# Patient Record
Sex: FEMALE | Race: BLACK OR AFRICAN AMERICAN | Employment: UNEMPLOYED | URBAN - METROPOLITAN AREA
[De-identification: names, ages, dates, MRNs, and addresses within clinical notes are randomized per-mention and may not be internally consistent; named-entity substitution may affect disease eponyms.]

---

## 2017-01-01 ENCOUNTER — OFFICE VISIT (OUTPATIENT)
Dept: FAMILY MEDICINE CLINIC | Age: 52
End: 2017-01-01

## 2017-01-01 ENCOUNTER — TELEPHONE (OUTPATIENT)
Dept: RHEUMATOLOGY | Age: 52
End: 2017-01-01

## 2017-01-01 ENCOUNTER — OFFICE VISIT (OUTPATIENT)
Dept: RHEUMATOLOGY | Age: 52
End: 2017-01-01

## 2017-01-01 VITALS
BODY MASS INDEX: 40.23 KG/M2 | TEMPERATURE: 97.6 F | OXYGEN SATURATION: 96 % | HEIGHT: 62 IN | WEIGHT: 218.6 LBS | RESPIRATION RATE: 20 BRPM | DIASTOLIC BLOOD PRESSURE: 68 MMHG | SYSTOLIC BLOOD PRESSURE: 139 MMHG | HEART RATE: 63 BPM

## 2017-01-01 VITALS
RESPIRATION RATE: 16 BRPM | HEART RATE: 68 BPM | WEIGHT: 225 LBS | DIASTOLIC BLOOD PRESSURE: 72 MMHG | TEMPERATURE: 97.7 F | SYSTOLIC BLOOD PRESSURE: 138 MMHG | HEIGHT: 62 IN | OXYGEN SATURATION: 98 % | BODY MASS INDEX: 41.41 KG/M2

## 2017-01-01 VITALS
BODY MASS INDEX: 37.69 KG/M2 | WEIGHT: 204.8 LBS | TEMPERATURE: 98.2 F | SYSTOLIC BLOOD PRESSURE: 136 MMHG | HEIGHT: 62 IN | RESPIRATION RATE: 18 BRPM | DIASTOLIC BLOOD PRESSURE: 57 MMHG | HEART RATE: 82 BPM

## 2017-01-01 DIAGNOSIS — I10 ESSENTIAL HYPERTENSION: ICD-10-CM

## 2017-01-01 DIAGNOSIS — M17.0 OSTEOARTHRITIS OF BOTH KNEES, UNSPECIFIED OSTEOARTHRITIS TYPE: ICD-10-CM

## 2017-01-01 DIAGNOSIS — F32.A DEPRESSION, UNSPECIFIED DEPRESSION TYPE: ICD-10-CM

## 2017-01-01 DIAGNOSIS — Z79.60 LONG-TERM USE OF IMMUNOSUPPRESSANT MEDICATION: ICD-10-CM

## 2017-01-01 DIAGNOSIS — I10 ESSENTIAL HYPERTENSION, BENIGN: Primary | ICD-10-CM

## 2017-01-01 DIAGNOSIS — Z12.39 BREAST CANCER SCREENING: ICD-10-CM

## 2017-01-01 DIAGNOSIS — N76.0 ACUTE VAGINITIS: ICD-10-CM

## 2017-01-01 DIAGNOSIS — M06.9 RHEUMATOID ARTHRITIS INVOLVING ANKLE, UNSPECIFIED LATERALITY, UNSPECIFIED RHEUMATOID FACTOR PRESENCE: ICD-10-CM

## 2017-01-01 DIAGNOSIS — R13.10 DYSPHAGIA, UNSPECIFIED TYPE: ICD-10-CM

## 2017-01-01 DIAGNOSIS — Z79.52 LONG TERM CURRENT USE OF SYSTEMIC STEROIDS: ICD-10-CM

## 2017-01-01 DIAGNOSIS — K21.9 GASTROESOPHAGEAL REFLUX DISEASE WITHOUT ESOPHAGITIS: ICD-10-CM

## 2017-01-01 DIAGNOSIS — M06.9 RHEUMATOID ARTHRITIS INVOLVING MULTIPLE SITES, UNSPECIFIED RHEUMATOID FACTOR PRESENCE: Primary | ICD-10-CM

## 2017-01-01 DIAGNOSIS — R60.9 DEPENDENT EDEMA: ICD-10-CM

## 2017-01-01 DIAGNOSIS — M94.9 DISORDER OF BONE AND CARTILAGE, UNSPECIFIED: ICD-10-CM

## 2017-01-01 DIAGNOSIS — M89.9 DISORDER OF BONE AND CARTILAGE, UNSPECIFIED: ICD-10-CM

## 2017-01-01 LAB
ALBUMIN SERPL-MCNC: 4 G/DL (ref 3.5–5.5)
ALBUMIN SERPL-MCNC: 4 G/DL (ref 3.5–5.5)
ALBUMIN/GLOB SERPL: 1.3 {RATIO} (ref 1.2–2.2)
ALBUMIN/GLOB SERPL: 1.4 {RATIO} (ref 1.2–2.2)
ALP SERPL-CCNC: 64 IU/L (ref 39–117)
ALP SERPL-CCNC: 72 IU/L (ref 39–117)
ALT SERPL-CCNC: 11 IU/L (ref 0–32)
ALT SERPL-CCNC: 9 IU/L (ref 0–32)
AST SERPL-CCNC: 13 IU/L (ref 0–40)
AST SERPL-CCNC: 14 IU/L (ref 0–40)
BASOPHILS # BLD AUTO: 0 X10E3/UL (ref 0–0.2)
BASOPHILS # BLD AUTO: 0 X10E3/UL (ref 0–0.2)
BASOPHILS NFR BLD AUTO: 1 %
BASOPHILS NFR BLD AUTO: 1 %
BILIRUB SERPL-MCNC: 0.2 MG/DL (ref 0–1.2)
BILIRUB SERPL-MCNC: 0.3 MG/DL (ref 0–1.2)
BUN SERPL-MCNC: 14 MG/DL (ref 6–24)
BUN SERPL-MCNC: 20 MG/DL (ref 6–24)
BUN/CREAT SERPL: 19 (ref 9–23)
BUN/CREAT SERPL: 26 (ref 9–23)
CALCIUM SERPL-MCNC: 10.1 MG/DL (ref 8.7–10.2)
CALCIUM SERPL-MCNC: 9.9 MG/DL (ref 8.7–10.2)
CHLORIDE SERPL-SCNC: 103 MMOL/L (ref 96–106)
CHLORIDE SERPL-SCNC: 107 MMOL/L (ref 96–106)
CO2 SERPL-SCNC: 19 MMOL/L (ref 18–29)
CO2 SERPL-SCNC: 22 MMOL/L (ref 18–29)
CREAT SERPL-MCNC: 0.73 MG/DL (ref 0.57–1)
CREAT SERPL-MCNC: 0.77 MG/DL (ref 0.57–1)
CRP SERPL-MCNC: 27.3 MG/L (ref 0–4.9)
CRP SERPL-MCNC: 8.1 MG/L (ref 0–4.9)
EOSINOPHIL # BLD AUTO: 0.1 X10E3/UL (ref 0–0.4)
EOSINOPHIL # BLD AUTO: 0.2 X10E3/UL (ref 0–0.4)
EOSINOPHIL NFR BLD AUTO: 2 %
EOSINOPHIL NFR BLD AUTO: 4 %
ERYTHROCYTE [DISTWIDTH] IN BLOOD BY AUTOMATED COUNT: 18.7 % (ref 12.3–15.4)
ERYTHROCYTE [DISTWIDTH] IN BLOOD BY AUTOMATED COUNT: 19.2 % (ref 12.3–15.4)
ERYTHROCYTE [SEDIMENTATION RATE] IN BLOOD BY WESTERGREN METHOD: 13 MM/HR (ref 0–40)
ERYTHROCYTE [SEDIMENTATION RATE] IN BLOOD BY WESTERGREN METHOD: 21 MM/HR (ref 0–40)
GFR SERPLBLD CREATININE-BSD FMLA CKD-EPI: 110 ML/MIN/1.73
GFR SERPLBLD CREATININE-BSD FMLA CKD-EPI: 95 ML/MIN/1.73
GLOBULIN SER CALC-MCNC: 2.8 G/DL (ref 1.5–4.5)
GLOBULIN SER CALC-MCNC: 3.1 G/DL (ref 1.5–4.5)
GLUCOSE SERPL-MCNC: 117 MG/DL (ref 65–99)
GLUCOSE SERPL-MCNC: 93 MG/DL (ref 65–99)
HCT VFR BLD AUTO: 37.8 % (ref 34–46.6)
HCT VFR BLD AUTO: 38.9 % (ref 34–46.6)
HGB BLD-MCNC: 12.2 G/DL (ref 11.1–15.9)
HGB BLD-MCNC: 13.3 G/DL (ref 11.1–15.9)
IMM GRANULOCYTES # BLD: 0 X10E3/UL (ref 0–0.1)
IMM GRANULOCYTES # BLD: 0 X10E3/UL (ref 0–0.1)
IMM GRANULOCYTES NFR BLD: 0 %
IMM GRANULOCYTES NFR BLD: 0 %
LYMPHOCYTES # BLD AUTO: 2.6 X10E3/UL (ref 0.7–3.1)
LYMPHOCYTES # BLD AUTO: 2.6 X10E3/UL (ref 0.7–3.1)
LYMPHOCYTES NFR BLD AUTO: 42 %
LYMPHOCYTES NFR BLD AUTO: 44 %
MCH RBC QN AUTO: 27.8 PG (ref 26.6–33)
MCH RBC QN AUTO: 30.1 PG (ref 26.6–33)
MCHC RBC AUTO-ENTMCNC: 32.3 G/DL (ref 31.5–35.7)
MCHC RBC AUTO-ENTMCNC: 34.2 G/DL (ref 31.5–35.7)
MCV RBC AUTO: 86 FL (ref 79–97)
MCV RBC AUTO: 88 FL (ref 79–97)
MONOCYTES # BLD AUTO: 0.3 X10E3/UL (ref 0.1–0.9)
MONOCYTES # BLD AUTO: 0.6 X10E3/UL (ref 0.1–0.9)
MONOCYTES NFR BLD AUTO: 10 %
MONOCYTES NFR BLD AUTO: 5 %
NEUTROPHILS # BLD AUTO: 2.7 X10E3/UL (ref 1.4–7)
NEUTROPHILS # BLD AUTO: 2.9 X10E3/UL (ref 1.4–7)
NEUTROPHILS NFR BLD AUTO: 43 %
NEUTROPHILS NFR BLD AUTO: 48 %
PLATELET # BLD AUTO: 386 X10E3/UL (ref 150–379)
PLATELET # BLD AUTO: 474 X10E3/UL (ref 150–379)
POTASSIUM SERPL-SCNC: 4.2 MMOL/L (ref 3.5–5.2)
POTASSIUM SERPL-SCNC: 5.4 MMOL/L (ref 3.5–5.2)
PROT SERPL-MCNC: 6.8 G/DL (ref 6–8.5)
PROT SERPL-MCNC: 7.1 G/DL (ref 6–8.5)
RBC # BLD AUTO: 4.39 X10E6/UL (ref 3.77–5.28)
RBC # BLD AUTO: 4.42 X10E6/UL (ref 3.77–5.28)
SODIUM SERPL-SCNC: 141 MMOL/L (ref 134–144)
SODIUM SERPL-SCNC: 146 MMOL/L (ref 134–144)
WBC # BLD AUTO: 5.9 X10E3/UL (ref 3.4–10.8)
WBC # BLD AUTO: 6.2 X10E3/UL (ref 3.4–10.8)

## 2017-01-01 RX ORDER — TOFACITINIB 5 MG/1
TABLET, FILM COATED ORAL
Qty: 60 TAB | Refills: 5 | Status: SHIPPED | OUTPATIENT
Start: 2017-01-01 | End: 2018-01-01 | Stop reason: ALTCHOICE

## 2017-01-01 RX ORDER — PREDNISONE 1 MG/1
TABLET ORAL
Qty: 120 TAB | Refills: 1 | Status: SHIPPED | OUTPATIENT
Start: 2017-01-01 | End: 2018-01-01 | Stop reason: SDUPTHER

## 2017-01-01 RX ORDER — FLUCONAZOLE 150 MG/1
TABLET ORAL
Qty: 1 TAB | Refills: 0 | Status: SHIPPED | OUTPATIENT
Start: 2017-01-01 | End: 2017-01-01 | Stop reason: SDUPTHER

## 2017-01-01 RX ORDER — PREDNISONE 1 MG/1
TABLET ORAL
Qty: 120 TAB | Refills: 4 | Status: SHIPPED | OUTPATIENT
Start: 2017-01-01 | End: 2017-01-01 | Stop reason: ALTCHOICE

## 2017-01-01 RX ORDER — HYDROCHLOROTHIAZIDE 25 MG/1
25 TABLET ORAL DAILY
Qty: 30 TAB | Refills: 11 | Status: SHIPPED | OUTPATIENT
Start: 2017-01-01 | End: 2018-01-01 | Stop reason: ALTCHOICE

## 2017-01-01 RX ORDER — PREDNISONE 1 MG/1
TABLET ORAL
Qty: 120 TAB | Refills: 1 | Status: SHIPPED | OUTPATIENT
Start: 2017-01-01 | End: 2017-01-01 | Stop reason: SDUPTHER

## 2017-01-01 RX ORDER — AMLODIPINE BESYLATE 5 MG/1
TABLET ORAL
Qty: 30 TAB | Refills: 3 | Status: SHIPPED | OUTPATIENT
Start: 2017-01-01 | End: 2017-01-01 | Stop reason: SDUPTHER

## 2017-01-01 RX ORDER — FLUCONAZOLE 150 MG/1
TABLET ORAL
Qty: 1 TAB | Refills: 3 | Status: SHIPPED | OUTPATIENT
Start: 2017-01-01 | End: 2018-01-01 | Stop reason: SDUPTHER

## 2017-01-01 RX ORDER — TOPIRAMATE 50 MG/1
50 TABLET, FILM COATED ORAL 2 TIMES DAILY
Qty: 60 TAB | Refills: 11 | Status: SHIPPED | OUTPATIENT
Start: 2017-01-01 | End: 2018-01-01 | Stop reason: ALTCHOICE

## 2017-01-10 ENCOUNTER — OFFICE VISIT (OUTPATIENT)
Dept: RHEUMATOLOGY | Age: 52
End: 2017-01-10

## 2017-01-10 VITALS
RESPIRATION RATE: 16 BRPM | HEIGHT: 62 IN | SYSTOLIC BLOOD PRESSURE: 132 MMHG | DIASTOLIC BLOOD PRESSURE: 81 MMHG | OXYGEN SATURATION: 99 % | WEIGHT: 211.2 LBS | HEART RATE: 67 BPM | TEMPERATURE: 97 F | BODY MASS INDEX: 38.87 KG/M2

## 2017-01-10 DIAGNOSIS — M17.0 OSTEOARTHRITIS OF BOTH KNEES, UNSPECIFIED OSTEOARTHRITIS TYPE: ICD-10-CM

## 2017-01-10 DIAGNOSIS — M25.531 RIGHT WRIST PAIN: ICD-10-CM

## 2017-01-10 DIAGNOSIS — Z79.52 LONG TERM CURRENT USE OF SYSTEMIC STEROIDS: ICD-10-CM

## 2017-01-10 DIAGNOSIS — M06.9 RHEUMATOID ARTHRITIS INVOLVING MULTIPLE SITES, UNSPECIFIED RHEUMATOID FACTOR PRESENCE: Primary | ICD-10-CM

## 2017-01-10 DIAGNOSIS — Z79.60 LONG-TERM USE OF IMMUNOSUPPRESSANT MEDICATION: ICD-10-CM

## 2017-01-10 RX ORDER — PREDNISONE 1 MG/1
TABLET ORAL
Qty: 120 TAB | Refills: 5 | Status: SHIPPED | OUTPATIENT
Start: 2017-01-10 | End: 2017-04-07 | Stop reason: SDUPTHER

## 2017-01-10 NOTE — MR AVS SNAPSHOT
Visit Information Date & Time Provider Department Dept. Phone Encounter #  
 1/10/2017 11:30 AM Juwan Henriquez MD [facility] [id number] Follow-up Instructions Return in about 2 months (around 3/10/2017). Your Appointments 2/10/2017  9:00 AM  
Follow Up with Norman Woodward MD  
[address] [place] [address] Oncology at [facility] Appt Note: Anemia, 6 month f/u  
 [address]  
[phone]  
  
   
 [address] [facility] [address]  
  
    
 2/13/2017  9:40 AM  
ROUTINE CARE with Geetha Levin  [facility] [address] ([address]) Appt Note: 1-2 months f/u  
 [address] [address]  
[phone]  
  
   
 [address] [address] Upcoming Health Maintenance Date Due  
 BREAST CANCER SCRN MAMMOGRAM 11/18/2016 PAP AKA CERVICAL CYTOLOGY 5/6/2018 DTaP/Tdap/Td series (2 - Td) 11/23/2021 COLONOSCOPY 7/25/2026 Allergies as of 1/10/2017  Review Complete On: 1/10/2017 By: Juwan Henriquez MD  
  
 Severity Noted Reaction Type Reactions Percocet [Oxycodone-acetaminophen] High 05/21/2010   Systemic Hives Reglan [Metoclopramide] High 06/29/2015   Systemic Anaphylaxis, Anxiety Ace Inhibitors  03/09/2016    Cough Tramadol  06/11/2013    Itching Current Immunizations  Reviewed on 1/10/2017 Name Date Hepatitis B Vaccine 9/25/1998, 4/21/1998, 3/24/1998 Influenza High Dose Vaccine PF 10/28/2014 Influenza Vaccine (Quad) PF 9/26/2016 Influenza Vaccine PF 11/8/2013 Influenza Vaccine Split 11/12/2012, 11/23/2011 PPD 6/11/2012,  Incomplete, 9/14/2011, 9/7/2011 Pneumococcal Vaccine (Unspecified Type) 11/19/2012 TDAP Vaccine 11/23/2011  Reviewed by Juwan Henriquez MD on 1/10/2017 at 11:47 AM  
 Reviewed by Juwan Henriquez MD on 1/10/2017 at 11:47 AM  
You Were Diagnosed With   
  
 Codes Comments Rheumatoid arthritis involving multiple sites, unspecified rheumatoid factor presence ([facility] [address]    -  Primary ICD-10-CM: M06.9 ICD-9-CM: 714.0 Long-term use of immunosuppressant medication     ICD-10-CM: Z79.899 ICD-9-CM: V58.69 Long term current use of systemic steroids     ICD-10-CM: Z79.52 
ICD-9-CM: V58.65 Right wrist pain     ICD-10-CM: M25.531 ICD-9-CM: 719.43 Osteoarthritis of both knees, unspecified osteoarthritis type     ICD-10-CM: M17.0 ICD-9-CM: 715.96 Vitals BP Pulse Temp Resp Height(growth percentile) Weight(growth percentile) 132/81 (BP 1 Location: Right arm, BP Patient Position: Sitting) 67 97 °F (36.1 °C) (Oral) 16 5' 2\" (1.575 m) 211 lb 3.2 oz (95.8 kg) LMP SpO2 BMI OB Status Smoking Status 04/29/2016 99% 38.63 kg/m2 Postmenopausal Never Smoker Vitals History BMI and BSA Data Body Mass Index Body Surface Area  
 38.63 kg/m 2 2.05 m 2 Preferred Pharmacy Pharmacy Name Phone [phone] - [address], [address] AT [place] [address] [phone] Your Updated Medication List  
  
   
This list is accurate as of: 1/10/17 12:10 PM.  Always use your most recent med list.  
  
  
  
  
 albuterol 90 mcg/actuation inhaler Commonly known as:  PROVENTIL HFA, VENTOLIN HFA, PROAIR HFA Take 2 Puffs by inhalation every six (6) hours as needed for Wheezing. ALPRAZolam 0.5 mg tablet Commonly known as:  XANAX  
1 tab po bid as needed for severe anxiety and/or sleep  
  
 amLODIPine 5 mg tablet Commonly known as:  Castillo Del Take 1 Tab by mouth daily. diclofenac EC 75 mg EC tablet Commonly known as:  VOLTAREN Take 1 Tab by mouth two (2) times a day. esomeprazole 40 mg capsule Commonly known as:  NexIUM Take 1 Cap by mouth daily. FLUoxetine 40 mg capsule Commonly known as:  PROzac Take 1 Cap by mouth daily. folic acid 1 mg tablet Commonly known as:  Google Take 1 Tab by mouth daily. gabapentin 100 mg capsule Commonly known as:  NEURONTIN  
2 caps po tid  
  
 hydroCHLOROthiazide 25 mg tablet Commonly known as:  HYDRODIURIL Take 1 Tab by mouth daily. lorcaserin 10 mg Tab Commonly known as:  Vallerie Homes Take 10 mg by mouth two (2) times a day. Max Daily Amount: 20 mg.  
  
 methocarbamol 500 mg tablet Commonly known as:  ROBAXIN Take 1 Tab by mouth nightly as needed. methotrexate 2.5 mg tablet Commonly known as:  Tracy Shaver Take 8 Tabs by mouth every . ondansetron 4 mg disintegrating tablet Commonly known as:  ZOFRAN ODT Take 1 Tab by mouth every eight (8) hours as needed for Nausea. oxybutynin 5 mg tablet Commonly known as:  UUXKOFIP Take 1 Tab by mouth two (2) times a day. predniSONE 1 mg tablet Commonly known as:  DELTASONE  
4 mg once daily. Taper by 1 mg every 4 weeks, if tolerated  
  
 sulfaSALAzine  mg EC tablet Commonly known as:  AZULFIDINE EN-TABS  
1 tab daily X 1 week; if tolerated 1 tab BID X 1 week; if tolerated, increase to 2 tabs BID  
  
 SUMAtriptan 50 mg tablet Commonly known as:  IMITREX Take 1 Tab by mouth once as needed for Migraine for 1 dose. Prescriptions Sent to Pharmacy Refills  
 predniSONE (DELTASONE) 1 mg tablet 5 Si mg once daily. Taper by 1 mg every 4 weeks, if tolerated Class: Normal  
 Pharmacy: [facility] [address] [place] [address] [address] [place] [address] Ph #: [phone] Follow-up Instructions Return in about 2 months (around 3/10/2017). To-Do List   
 01/10/2017 Imaging:  XR WRIST RT AP/LAT   
  
 2017 Lab:  C REACTIVE PROTEIN, QT   
  
 2017 Lab:  CBC WITH AUTOMATED DIFF   
  
 2017 Lab:  LIPID PANEL   
  
 2017   Lab:  METABOLIC PANEL, COMPREHENSIVE   
  
 02/09/2017 Lab:  SED RATE (ESR) Patient Instructions Vitamin D3 2000 units: take 2000 units twice daily for 2 months and then 2000 units once daily Xray today Fasting labs about 4 weeks Lower prednisone by 1 mg every 1 month if tolerated Introducing [facility] SERVICES! Dear Romana Drew: 
Thank you for requesting a [email or web address] account. Our records indicate that you have previously registered for a [employer] account but its currently inactive. Please call our [employer] support line at [phone]. Additional Information If you have questions, please visit the Frequently Asked Questions section of the [email or web address] website at [email or web address] Remember, [email or web address] is NOT to be used for urgent needs. For medical emergencies, dial 911. Now available from your iPhone and Android! Please provide this summary of care documentation to your next provider. Your primary care clinician is listed as Hossein Mo. If you have any questions after today's visit, please call [phone].

## 2017-01-10 NOTE — PATIENT INSTRUCTIONS
Vitamin D3 2000 units: take 2000 units twice daily for 2 months and then 2000 units once daily    Xray today    Fasting labs about 4 weeks    Lower prednisone by 1 mg every 1 month if tolerated

## 2017-01-25 DIAGNOSIS — M25.562 CHRONIC PAIN OF BOTH KNEES: ICD-10-CM

## 2017-01-25 DIAGNOSIS — G89.29 CHRONIC PAIN OF BOTH KNEES: ICD-10-CM

## 2017-01-25 DIAGNOSIS — M25.561 CHRONIC PAIN OF BOTH KNEES: ICD-10-CM

## 2017-01-25 NOTE — TELEPHONE ENCOUNTER
[facility]  [phone]    Patient is requesting a refill of diclofenac. She states that she is out of the medication. Pharmacy verified.

## 2017-01-26 RX ORDER — DICLOFENAC SODIUM 75 MG/1
75 TABLET, DELAYED RELEASE ORAL 2 TIMES DAILY
Qty: 30 TAB | Refills: 5 | Status: SHIPPED | OUTPATIENT
Start: 2017-01-26

## 2017-02-10 DIAGNOSIS — D50.0 IRON DEFICIENCY ANEMIA DUE TO CHRONIC BLOOD LOSS: ICD-10-CM

## 2017-04-07 RX ORDER — PREDNISONE 1 MG/1
TABLET ORAL
Qty: 120 TAB | Refills: 0 | Status: SHIPPED | OUTPATIENT
Start: 2017-04-07 | End: 2017-04-25 | Stop reason: SDUPTHER

## 2017-04-25 ENCOUNTER — OFFICE VISIT (OUTPATIENT)
Dept: RHEUMATOLOGY | Age: 52
End: 2017-04-25

## 2017-04-25 VITALS
HEIGHT: 62 IN | HEART RATE: 74 BPM | BODY MASS INDEX: 41.22 KG/M2 | WEIGHT: 224 LBS | SYSTOLIC BLOOD PRESSURE: 162 MMHG | RESPIRATION RATE: 18 BRPM | DIASTOLIC BLOOD PRESSURE: 74 MMHG | TEMPERATURE: 98.3 F | OXYGEN SATURATION: 97 %

## 2017-04-25 DIAGNOSIS — R13.10 DYSPHAGIA, UNSPECIFIED TYPE: ICD-10-CM

## 2017-04-25 DIAGNOSIS — Z79.52 LONG TERM CURRENT USE OF SYSTEMIC STEROIDS: ICD-10-CM

## 2017-04-25 DIAGNOSIS — Z79.60 LONG-TERM USE OF IMMUNOSUPPRESSANT MEDICATION: ICD-10-CM

## 2017-04-25 DIAGNOSIS — M06.9 RHEUMATOID ARTHRITIS INVOLVING MULTIPLE SITES, UNSPECIFIED RHEUMATOID FACTOR PRESENCE: Primary | ICD-10-CM

## 2017-04-25 RX ORDER — METHOTREXATE 2.5 MG/1
20 TABLET ORAL
Qty: 36 TAB | Refills: 3 | Status: SHIPPED | OUTPATIENT
Start: 2017-04-30 | End: 2018-01-01 | Stop reason: SDUPTHER

## 2017-04-25 RX ORDER — PREDNISONE 1 MG/1
TABLET ORAL
Qty: 120 TAB | Refills: 3 | Status: SHIPPED | OUTPATIENT
Start: 2017-04-25 | End: 2017-06-29 | Stop reason: SDUPTHER

## 2017-04-25 NOTE — MR AVS SNAPSHOT
Visit Information Date & Time Provider Department Dept. Phone Encounter #  
 4/25/2017 10:00 AM Neri Schmitz MD [facility] [id number] Follow-up Instructions Return in about 10 weeks (around 7/4/2017). Your Appointments 5/10/2017 11:00 AM  
ROUTINE CARE with Cole Wilkerson  [facility] ([address] [facility]) Appt Note: follow up/jdp/04/20/17  
 [phone]  
  
   
 [address] [address] Upcoming Health Maintenance Date Due  
 BREAST CANCER SCRN MAMMOGRAM 11/18/2016 PAP AKA CERVICAL CYTOLOGY 5/6/2018 DTaP/Tdap/Td series (2 - Td) 11/23/2021 COLONOSCOPY 7/25/2026 Allergies as of 4/25/2017  Review Complete On: 4/25/2017 By: Halima Corado PA-C Severity Noted Reaction Type Reactions Percocet [Oxycodone-acetaminophen] High 05/21/2010   Systemic Hives Reglan [Metoclopramide] High 06/29/2015   Systemic Anaphylaxis, Anxiety Ace Inhibitors  03/09/2016    Cough Tramadol  06/11/2013    Itching Current Immunizations  Reviewed on 4/25/2017 Name Date Hepatitis B Vaccine 9/25/1998, 4/21/1998, 3/24/1998 Influenza High Dose Vaccine PF 10/28/2014 Influenza Vaccine (Quad) PF 9/26/2016 Influenza Vaccine PF 11/8/2013 Influenza Vaccine Split 11/12/2012, 11/23/2011 PPD 6/11/2012,  Incomplete, 9/14/2011, 9/7/2011 Pneumococcal Vaccine (Unspecified Type) 11/19/2012 TDAP Vaccine 11/23/2011 Reviewed by Halima Corado PA-C on 4/25/2017 at 11:05 AM  
You Were Diagnosed With   
  
 Codes Comments Rheumatoid arthritis involving multiple sites, unspecified rheumatoid factor presence ([facility] [address]    -  Primary ICD-10-CM: M06.9 ICD-9-CM: 714.0 Long-term use of immunosuppressant medication     ICD-10-CM: Z79.899 ICD-9-CM: V58.69  Long term current use of systemic steroids     ICD-10-CM: Z79.52 
 ICD-9-CM: V58.65 Dysphagia, unspecified type     ICD-10-CM: R13.10 ICD-9-CM: 787.20 Vitals BP Pulse Temp Resp Height(growth percentile) Weight(growth percentile) 162/74 (BP 1 Location: Right arm, BP Patient Position: Sitting) 74 98.3 °F (36.8 °C) (Oral) 18 5' 2\" (1.575 m) 224 lb (101.6 kg) LMP SpO2 BMI OB Status Smoking Status 04/29/2016 97% 40.97 kg/m2 Postmenopausal Never Smoker Vitals History BMI and BSA Data Body Mass Index Body Surface Area 40.97 kg/m 2 2.11 m 2 Preferred Pharmacy Pharmacy Name Phone [facility] [address] [facility] [address] [place] [address] Your Updated Medication List  
  
   
This list is accurate as of: 4/25/17 11:17 AM.  Always use your most recent med list.  
  
  
  
  
 albuterol 90 mcg/actuation inhaler Commonly known as:  PROVENTIL HFA, VENTOLIN HFA, PROAIR HFA Take 2 Puffs by inhalation every six (6) hours as needed for Wheezing. ALPRAZolam 0.5 mg tablet Commonly known as:  XANAX  
1 tab po bid as needed for severe anxiety and/or sleep  
  
 amLODIPine 5 mg tablet Commonly known as:  Monique Coop Take 1 Tab by mouth daily. diclofenac EC 75 mg EC tablet Commonly known as:  VOLTAREN Take 1 Tab by mouth two (2) times a day. esomeprazole 40 mg capsule Commonly known as:  NexIUM Take 1 Cap by mouth daily. FLUoxetine 40 mg capsule Commonly known as:  PROzac Take 1 Cap by mouth daily. folic acid 1 mg tablet Commonly known as:  Google Take 1 Tab by mouth daily. gabapentin 100 mg capsule Commonly known as:  NEURONTIN  
2 caps po tid  
  
 hydroCHLOROthiazide 25 mg tablet Commonly known as:  HYDRODIURIL Take 1 Tab by mouth daily. lorcaserin 10 mg Tab Commonly known as:  Kellie Stalker Take 10 mg by mouth two (2) times a day. Max Daily Amount: 20 mg.  
  
 methocarbamol 500 mg tablet Commonly known as:  ROBAXIN  
 Take 1 Tab by mouth nightly as needed. methotrexate 2.5 mg tablet Commonly known as:  Carrolyn Mela Take 8 Tabs by mouth every Sunday. Start taking on:  4/30/2017  
  
 ondansetron 4 mg disintegrating tablet Commonly known as:  ZOFRAN ODT Take 1 Tab by mouth every eight (8) hours as needed for Nausea. oxybutynin 5 mg tablet Commonly known as:  BPGFAUBE Take 1 Tab by mouth two (2) times a day. predniSONE 1 mg tablet Commonly known as:  DELTASONE  
TAKE 4 TABLETS BY MOUTH EVERY DAY, TAPER BY 1 MG(1 TABLET) EVERY 4 WEEKS IF TOLERATED  
  
 sulfaSALAzine  mg EC tablet Commonly known as:  AZULFIDINE EN-TABS  
1 tab daily X 1 week; if tolerated 1 tab BID X 1 week; if tolerated, increase to 2 tabs BID  
  
 SUMAtriptan 50 mg tablet Commonly known as:  IMITREX Take 1 Tab by mouth once as needed for Migraine for 1 dose. XELJANZ 5 mg tab Generic drug:  tofacitinib Take  by mouth two (2) times a day. Prescriptions Sent to Pharmacy Refills  
 predniSONE (DELTASONE) 1 mg tablet 3 Sig: TAKE 4 TABLETS BY MOUTH EVERY DAY, TAPER BY 1 MG(1 TABLET) EVERY 4 WEEKS IF TOLERATED Class: Normal  
 Pharmacy: [address] [place] [address] Ph #: [phone]  
 methotrexate (RHEUMATREX) 2.5 mg tablet 3 Starting on: 4/30/2017 Sig: Take 8 Tabs by mouth every Sunday. Class: Normal  
 [facility] [address] [place] [address] Ph #: [phone] Route: Oral  
  
We Performed the Following C REACTIVE PROTEIN, QT [08661 CPT(R)] CBC WITH AUTOMATED DIFF [26221 CPT(R)] LIPID PANEL [63668 CPT(R)] METABOLIC PANEL, COMPREHENSIVE [19499 CPT(R)] SED RATE (ESR) [id number] CPT(R)] Follow-up Instructions Return in about 10 weeks (around 7/4/2017). To-Do List   
 04/25/2017 Imaging:  DEXA BONE DENSITY STUDY AXIAL Patient Instructions Continue current doses of medicines: 
Xeljanz 5 mg twice a day Sulfasalzine 1000 mg twice a day Methotrexate 20 mg (8 pills) weekly Prednisone 4 mg daily In about 4 weeks, if feeling well, then lower the prednisone to 3 mg daily for 2-4 weeks, and then if doing well lower to prednisone 2 mg daily until next visit. Schedule bone density at your convenience in the next few months. See Ms. Consuelo Alegria about the sore throat in the mornings, trouble swallowing and pills getting stuck. Start the Vitamin D3 at 2000 units daily Introducing [facility] SERVICES! Dear Mary Chacorta: 
Thank you for requesting a [employer] account. Our records indicate that you have previously registered for a [employer] account but its currently inactive. Please call our [employer] support line at [phone]. Additional Information If you have questions, please visit the Frequently Asked Questions section of the [employer] website at [email or web address] Remember, [employer] is NOT to be used for urgent needs. For medical emergencies, dial 911. Now available from your iPhone and Android! Please provide this summary of care documentation to your next provider. Your primary care clinician is listed as Inna Scott. If you have any questions after today's visit, please call [phone].

## 2017-04-25 NOTE — PATIENT INSTRUCTIONS
Continue current doses of medicines:  Xeljanz 5 mg twice a day  Sulfasalzine 1000 mg twice a day  Methotrexate 20 mg (8 pills) weekly  Prednisone 4 mg daily    In about 4 weeks, if feeling well, then lower the prednisone to 3 mg daily for 2-4 weeks, and then if doing well lower to prednisone 2 mg daily until next visit. Schedule bone density at your convenience in the next few months. See Ms. [place] about the sore throat in the mornings, trouble swallowing and pills getting stuck.      Start the Vitamin D3 at 2000 units daily

## 2017-04-25 NOTE — PROGRESS NOTES
HISTORY OF PRESENT ILLNESS  Belle Gonzales is a 46 y.o. female. HPI  She presents for follow up of rheumatoid arthritis, on methotrexate 20 mg qwk, SSZ 1000 mg bid, prednisone 4 mg daily, and newly started Xeljanz 5 mg bid in mid-January after last visit. \"I don't know if it's helping at all\". She has joint pain in neck, shoulders, elbows, hands, and feet. Associated sxs include joint swelling in hands and ankles; morning joint stiffness x 30 min on imtiaz day, or x 3 hours on rainy days like today. She takes diclofenac 75 mg about every 3 weeks. She is not taking vitamin D, she hasn't been able to get it yet. She walks around her house, steps, and yard to get exercise, or will do some chores to stay active. She had a 24 hour GI bug back in January that resolved quickly without incident. No other major illness or fever. She notes sore throat in the mornings for about 6 months. She notes some difficulty swallowing and sometimes a pill will get stuck that she has to regurgitate the pill back out and then swallow it again. She has reflux, on nexium but denies any breakthrough heartburn sxs while awake. She had been referred for an upper endoscopy by her heme/onc Sg Zelaya (sees prn for iron defic anemia), but never had the procedure done. Upon discussion, on nice imtiaz days recently she has been feeling very well, sxs increase on rainy days. Review of Systems   Constitutional: Negative for fever and weight loss. HENT: Negative for sore throat. Eyes: Negative for blurred vision. Respiratory: Negative for cough, sputum production and shortness of breath. Cardiovascular: Positive for leg swelling. Gastrointestinal: Negative for abdominal pain, blood in stool, melena, nausea and vomiting. Difficulty swallowing   Musculoskeletal: Negative for falls. Skin: Negative for rash. Endo/Heme/Allergies: Negative for polydipsia. Bruises/bleeds easily.        Allergies   Allergen Reactions    Percocet [Oxycodone-Acetaminophen] Hives    Reglan [Metoclopramide] Anaphylaxis and Anxiety    Ace Inhibitors Cough    Tramadol Itching       Current Outpatient Prescriptions   Medication Sig Dispense Refill    tofacitinib (XELJANZ) 5 mg tab Take  by mouth two (2) times a day.  predniSONE (DELTASONE) 1 mg tablet TAKE 4 TABLETS BY MOUTH EVERY DAY, TAPER BY 1 MG(1 TABLET) EVERY 4 WEEKS IF TOLERATED 120 Tab 0    diclofenac EC (VOLTAREN) 75 mg EC tablet Take 1 Tab by mouth two (2) times a day. 30 Tab 5    methotrexate (RHEUMATREX) 2.5 mg tablet Take 8 Tabs by mouth every Sunday. 36 Tab 3    folic acid (FOLVITE) 1 mg tablet Take 1 Tab by mouth daily. 90 Tab 3    hydrochlorothiazide (HYDRODIURIL) 25 mg tablet Take 1 Tab by mouth daily. 30 Tab 11    gabapentin (NEURONTIN) 100 mg capsule 2 caps po tid 90 Cap 5    sulfaSALAzine EC (AZULFIDINE EN-TABS) 500 mg EC tablet 1 tab daily X 1 week; if tolerated 1 tab BID X 1 week; if tolerated, increase to 2 tabs  Tab 5    albuterol (PROVENTIL HFA, VENTOLIN HFA, PROAIR HFA) 90 mcg/actuation inhaler Take 2 Puffs by inhalation every six (6) hours as needed for Wheezing. 1 Inhaler 0    amLODIPine (NORVASC) 5 mg tablet Take 1 Tab by mouth daily. 30 Tab 5    methocarbamol (ROBAXIN) 500 mg tablet Take 1 Tab by mouth nightly as needed. 30 Tab 1    oxybutynin (DITROPAN) 5 mg tablet Take 1 Tab by mouth two (2) times a day. 60 Tab 5    FLUoxetine (PROZAC) 40 mg capsule Take 1 Cap by mouth daily. 30 Cap 5    ALPRAZolam (XANAX) 0.5 mg tablet 1 tab po bid as needed for severe anxiety and/or sleep 60 Tab 0    SUMAtriptan (IMITREX) 50 mg tablet Take 1 Tab by mouth once as needed for Migraine for 1 dose. 15 Tab 3    lorcaserin (BELVIQ) 10 mg tab Take 10 mg by mouth two (2) times a day. Max Daily Amount: 20 mg. 60 Tab 1    ondansetron (ZOFRAN ODT) 4 mg disintegrating tablet Take 1 Tab by mouth every eight (8) hours as needed for Nausea.  10 Tab 0    esomeprazole (NEXIUM) 40 mg capsule Take 1 Cap by mouth daily. 30 Cap 3       Past medical, surgical, and family hx reviewed. Vitals:    04/25/17 1010   BP: 162/74   Pulse: 74   Resp: 18   Temp: 98.3 °F (36.8 °C)   TempSrc: Oral   SpO2: 97%   Weight: 224 lb (101.6 kg)   Height: 5' 2\" (1.575 m)   PainSc:   7   PainLoc: Generalized   LMP: 04/29/2016       Physical Exam   Constitutional: She is oriented to person, place, and time. She appears well-developed and well-nourished. HENT:   Mouth/Throat: Oropharynx is clear and moist.   Eyes: Conjunctivae are normal. Pupils are equal, round, and reactive to light. Neck: Neck supple. Cardiovascular: Normal rate and regular rhythm. Murmur heard. Pulmonary/Chest: Effort normal and breath sounds normal. She has no wheezes. Abdominal: Soft. Bowel sounds are normal. There is no tenderness. Musculoskeletal:   -synovitis: right hand MCP 4, left hand MCP 4,5 )overall hands noted to be less swollen and cooler)  -right wrist reduced ROM  -right proximal forearm tender nodule  -reducible ulnar deviation bilateral MCPs without pain   Lymphadenopathy:     She has no cervical adenopathy. Neurological: She is alert and oriented to person, place, and time. Skin: Skin is warm and dry. Psychiatric: She has a normal mood and affect. Thought content normal.   Vitals reviewed. ASSESSMENT and PLAN    ICD-10-CM ICD-9-CM    1. Rheumatoid arthritis involving multiple sites, unspecified rheumatoid factor presence (HCC) - reducible ulnar deviation on exam. Symptoms were controlled with prednisone 5 mg daily, methotrexate 20 mg weekly, and Enbrel 50 mg weekly, along with SSZ 1 gram BID. Symptoms flare noted at last visit. She has stopped Enbrel. Current prednisone is 4 mg daily. She began [place] 5 mg bid in January 2017. She has been doing well with flare of symptoms on rainy days, likely related to comorbid OA.  M06.9 714.0 C REACTIVE PROTEIN, QT    Continue current regimen:  -SSZ 1 gram BID  -methotrexate 20 mg weekly  -Xeljanz 5 mg BID    Keep prednisone at 4 mg daily for now. If doing well in 4 weeks, then lower prednisone to 3 mg daily and lower by 1 mg 2-4 wks as tolerated. SED RATE (ESR)      DEXA BONE DENSITY STUDY AXIAL      predniSONE (DELTASONE) 1 mg tablet      methotrexate (RHEUMATREX) 2.5 mg tablet   2. Long-term use of immunosuppressant medication K37.892 P09.86 METABOLIC PANEL, COMPREHENSIVE      CBC WITH AUTOMATED DIFF      LIPID PANEL   3. Long term current use of systemic steroids - DXA normal 2011 Z79.52 V58.65 DEXA BONE DENSITY STUDY AXIAL    -calcium 1200 mg daily total  -vitamin D3 2000 units daily     4. Dysphagia, unspecified type - difficulty swallowing with pills getting stuck and regurgitating back up R13.10 787.20 F/u with PCP for further eval, would advise upper endoscopy          Follow-up Disposition:  Return in about 10 weeks (around 7/4/2017). I have reviewed and discussed all findings with Dr. Rai Pinedo, who also examined the patient, and he agrees with the assessment and plan. ADDENDUM: I have seen and examined the patient. I have discussed the relevant findings with Ms. Danielle and concur with the assessment and plan. Overall improving RA wit haddition of Xeljanz 5 mg BID. Continue this wit hSSZ and methotrexate at current doses. If feasible, slow taper of prednisone as noted. Anemia improved. DXA given prednisone use. Comfortable, safe, low impact exercise encouraged. F/U as noted.   Sheryle Mons MD

## 2017-04-26 LAB
ALBUMIN SERPL-MCNC: 4.1 G/DL (ref 3.5–5.5)
ALBUMIN/GLOB SERPL: 1.3 {RATIO} (ref 1.2–2.2)
ALP SERPL-CCNC: 84 IU/L (ref 39–117)
ALT SERPL-CCNC: 19 IU/L (ref 0–32)
AST SERPL-CCNC: 24 IU/L (ref 0–40)
BASOPHILS # BLD AUTO: 0 X10E3/UL (ref 0–0.2)
BASOPHILS NFR BLD AUTO: 0 %
BILIRUB SERPL-MCNC: <0.2 MG/DL (ref 0–1.2)
BUN SERPL-MCNC: 14 MG/DL (ref 6–24)
BUN/CREAT SERPL: 20 (ref 9–23)
CALCIUM SERPL-MCNC: 9.4 MG/DL (ref 8.7–10.2)
CHLORIDE SERPL-SCNC: 101 MMOL/L (ref 96–106)
CHOLEST SERPL-MCNC: 252 MG/DL (ref 100–199)
CO2 SERPL-SCNC: 22 MMOL/L (ref 18–29)
CREAT SERPL-MCNC: 0.69 MG/DL (ref 0.57–1)
CRP SERPL-MCNC: 28.3 MG/L (ref 0–4.9)
EOSINOPHIL # BLD AUTO: 0.1 X10E3/UL (ref 0–0.4)
EOSINOPHIL NFR BLD AUTO: 2 %
ERYTHROCYTE [DISTWIDTH] IN BLOOD BY AUTOMATED COUNT: 16.2 % (ref 12.3–15.4)
ERYTHROCYTE [SEDIMENTATION RATE] IN BLOOD BY WESTERGREN METHOD: 11 MM/HR (ref 0–40)
GLOBULIN SER CALC-MCNC: 3.1 G/DL (ref 1.5–4.5)
GLUCOSE SERPL-MCNC: 84 MG/DL (ref 65–99)
HCT VFR BLD AUTO: 38.7 % (ref 34–46.6)
HDLC SERPL-MCNC: 101 MG/DL
HGB BLD-MCNC: 12.9 G/DL (ref 11.1–15.9)
IMM GRANULOCYTES # BLD: 0 X10E3/UL (ref 0–0.1)
IMM GRANULOCYTES NFR BLD: 0 %
LDLC SERPL CALC-MCNC: 136 MG/DL (ref 0–99)
LYMPHOCYTES # BLD AUTO: 3.3 X10E3/UL (ref 0.7–3.1)
LYMPHOCYTES NFR BLD AUTO: 57 %
MCH RBC QN AUTO: 29.7 PG (ref 26.6–33)
MCHC RBC AUTO-ENTMCNC: 33.3 G/DL (ref 31.5–35.7)
MCV RBC AUTO: 89 FL (ref 79–97)
MONOCYTES # BLD AUTO: 0.4 X10E3/UL (ref 0.1–0.9)
MONOCYTES NFR BLD AUTO: 6 %
NEUTROPHILS # BLD AUTO: 2 X10E3/UL (ref 1.4–7)
NEUTROPHILS NFR BLD AUTO: 35 %
PLATELET # BLD AUTO: 354 X10E3/UL (ref 150–379)
POTASSIUM SERPL-SCNC: 4.6 MMOL/L (ref 3.5–5.2)
PROT SERPL-MCNC: 7.2 G/DL (ref 6–8.5)
RBC # BLD AUTO: 4.34 X10E6/UL (ref 3.77–5.28)
SODIUM SERPL-SCNC: 141 MMOL/L (ref 134–144)
TRIGL SERPL-MCNC: 76 MG/DL (ref 0–149)
VLDLC SERPL CALC-MCNC: 15 MG/DL (ref 5–40)
WBC # BLD AUTO: 5.8 X10E3/UL (ref 3.4–10.8)

## 2017-05-08 RX ORDER — SULFASALAZINE 500 MG/1
1000 TABLET, DELAYED RELEASE ORAL 2 TIMES DAILY
Qty: 120 TAB | Refills: 5 | Status: SHIPPED | OUTPATIENT
Start: 2017-05-08 | End: 2018-01-01 | Stop reason: ALTCHOICE

## 2017-05-11 ENCOUNTER — HOSPITAL ENCOUNTER (OUTPATIENT)
Dept: BONE DENSITY | Age: 52
Discharge: HOME | End: 2017-05-11
Payer: SELF-PAY

## 2017-05-11 ENCOUNTER — TELEPHONE (OUTPATIENT)
Dept: RHEUMATOLOGY | Age: 52
End: 2017-05-11

## 2017-05-11 DIAGNOSIS — M06.9 RHEUMATOID ARTHRITIS INVOLVING MULTIPLE SITES, UNSPECIFIED RHEUMATOID FACTOR PRESENCE: ICD-10-CM

## 2017-05-11 DIAGNOSIS — Z79.52 LONG TERM CURRENT USE OF SYSTEMIC STEROIDS: ICD-10-CM

## 2017-05-11 PROCEDURE — 77080 DXA BONE DENSITY AXIAL: CPT

## 2017-05-11 NOTE — TELEPHONE ENCOUNTER
Patient would like a return call from nurse regarding one of her medications is triggering migraines.  [phone]

## 2017-05-11 NOTE — TELEPHONE ENCOUNTER
Lower sulfasalazine to one tab twice daily from 2 tabs twice daily. If improved headaches, continue this dose. If persistent headache, stop medication. Review with PCP as well.

## 2017-05-11 NOTE — TELEPHONE ENCOUNTER
Returned call and spoke with Ms. Lizeth Adair and gave instructions per Dr. Darío Willis: \"Lower sulfasalazine to one tab twice daily from 2 tabs twice daily. If improved headaches, continue this dose. If persistent headache, stop medication. Review with PCP as well. \"  Understanding was verbalized of these instructions.

## 2017-05-11 NOTE — TELEPHONE ENCOUNTER
Returned called to Ms. [place] who stated she gets migraines every time she takes her sulfasalazine. Just recently noticed this occurring. She has been having the headaches ever since she started but never gave it any thought.

## 2017-06-20 NOTE — PROGRESS NOTES
Mild osteopenia. Slight decline from 2011, though direct comparison not possible due to different machines. See letter.

## 2017-06-29 DIAGNOSIS — M06.9 RHEUMATOID ARTHRITIS INVOLVING MULTIPLE SITES, UNSPECIFIED RHEUMATOID FACTOR PRESENCE: ICD-10-CM

## 2017-06-29 RX ORDER — PREDNISONE 1 MG/1
TABLET ORAL
Qty: 120 TAB | Refills: 0 | Status: SHIPPED | OUTPATIENT
Start: 2017-06-29 | End: 2017-01-01 | Stop reason: SDUPTHER

## 2017-07-10 NOTE — PROGRESS NOTES
HISTORY OF PRESENT ILLNESS  Jeff Ernandez is a 46 y.o. female. HPI  Chief Complaint   Patient presents with    Weight Management     Follow up. Jeff Ernandez is a 46 y.o. female here to fu on weight, htn and cholesterol. Pt presents to office today for a follow up Weight management and Cholesterol problem. She was unable to afford the belviq for weight loss. Currently she is trying to be as active as possible but has some restrictions due to her arthritis. States also she tends to be a stress eater. Has not recently done weight watchers but did well with it years ago. Had lost 75 lbs.soemtimes she does stock up on the weight watchers dinners. Her weight has not changed from her previous visit 3-4 months ago. She is not currently on cholesterol medication. Recently h ad labs done by dr Joselito Cheng. She takes hctz 25mg and norvasc 5mg daily for her bp. States her knees are not too painful this am. As previously discussed her ortho would like her to lose weight (down to 180 lbs) prior to considering knee replacement). Current Outpatient Prescriptions   Medication Sig Dispense Refill    predniSONE (DELTASONE) 1 mg tablet TAKE 4 TABLETS BY MOUTH DAILY TAPER DOWN 1 TABLET EVERY 4 WEEKS IF TOLERATED. 120 Tab 0    sulfaSALAzine EC (AZULFIDINE EN-TABS) 500 mg EC tablet Take 2 Tabs by mouth two (2) times a day. (Patient taking differently: Take 1,000 mg by mouth two (2) times a day. Takes 1 tab daily.) 120 Tab 5    tofacitinib (XELJANZ) 5 mg tab Take  by mouth two (2) times a day.  methotrexate (RHEUMATREX) 2.5 mg tablet Take 8 Tabs by mouth every Sunday. 36 Tab 3    diclofenac EC (VOLTAREN) 75 mg EC tablet Take 1 Tab by mouth two (2) times a day. 30 Tab 5    folic acid (FOLVITE) 1 mg tablet Take 1 Tab by mouth daily. 90 Tab 3    hydrochlorothiazide (HYDRODIURIL) 25 mg tablet Take 1 Tab by mouth daily.  30 Tab 11    gabapentin (NEURONTIN) 100 mg capsule 2 caps po tid 90 Cap 5    albuterol (PROVENTIL HFA, VENTOLIN HFA, PROAIR HFA) 90 mcg/actuation inhaler Take 2 Puffs by inhalation every six (6) hours as needed for Wheezing. 1 Inhaler 0    amLODIPine (NORVASC) 5 mg tablet Take 1 Tab by mouth daily. 30 Tab 5    methocarbamol (ROBAXIN) 500 mg tablet Take 1 Tab by mouth nightly as needed. 30 Tab 1    oxybutynin (DITROPAN) 5 mg tablet Take 1 Tab by mouth two (2) times a day. 60 Tab 5    FLUoxetine (PROZAC) 40 mg capsule Take 1 Cap by mouth daily. 30 Cap 5    ALPRAZolam (XANAX) 0.5 mg tablet 1 tab po bid as needed for severe anxiety and/or sleep 60 Tab 0    ondansetron (ZOFRAN ODT) 4 mg disintegrating tablet Take 1 Tab by mouth every eight (8) hours as needed for Nausea. 10 Tab 0    SUMAtriptan (IMITREX) 50 mg tablet Take 1 Tab by mouth once as needed for Migraine for 1 dose. 15 Tab 3    esomeprazole (NEXIUM) 40 mg capsule Take 1 Cap by mouth daily. 30 Cap 3    lorcaserin (BELVIQ) 10 mg tab Take 10 mg by mouth two (2) times a day.  Max Daily Amount: 20 mg. 60 Tab 1     Allergies   Allergen Reactions    Percocet [Oxycodone-Acetaminophen] Hives    Reglan [Metoclopramide] Anaphylaxis and Anxiety    Ace Inhibitors Cough    Tramadol Itching     Past Medical History:   Diagnosis Date    Aortic valve insufficiency 2016    Bilateral ovarian cysts 2015    Cataract     Coagulation defects     recently anemic    Fe deficiency anemia     Gastrointestinal disorder     GERD    GERD (gastroesophageal reflux disease)     Hypertension     Iritis     Migraine without status migrainosus, not intractable 2016    Pulmonary embolism (HCC)     RA [facility]umatoid arthritis) ([facility]     Thromboembolus ([facility] 4-5-12    right calf    Thyroid disease     nodules on thyroid     Past Surgical History:   Procedure Laterality Date    ENDOSCOPY, COLON, DIAGNOSTIC      normal    HX GYN      tubal ligation    HX GYN  ,      x2    HX ORTHOPAEDIC      R foot surgery     Family History   Problem Relation Age of Onset   Hay Zheng Arthritis-rheumatoid Father     Cancer Father      lung    Hypertension Father     Lung Disease Father      lung cancer    Migraines Father      cluster migraines    Arthritis-rheumatoid Sister     Headache Sister     Arthritis-osteo Sister     Diabetes Maternal Aunt     Cancer Maternal Grandmother      cervical     Social History   Substance Use Topics    Smoking status: Never Smoker    Smokeless tobacco: Never Used    Alcohol use No       Review of Systems   Constitutional: Negative for chills, diaphoresis, fever, malaise/fatigue and weight loss. HENT: Negative for congestion, ear discharge, ear pain, hearing loss, nosebleeds, sore throat and tinnitus. Eyes: Negative for blurred vision, photophobia, pain, discharge and redness. Respiratory: Negative for cough, hemoptysis, sputum production, shortness of breath, wheezing and stridor. Cardiovascular: Negative for chest pain, palpitations, orthopnea and leg swelling. Gastrointestinal: Negative for abdominal pain, diarrhea, nausea and vomiting. Musculoskeletal:        Chronic knee/joint pain   Neurological: Negative for weakness and headaches. All other systems reviewed and are negative. Physical Exam   Constitutional: She appears well-developed and well-nourished. No significant weight change from her last ov    HENT:   Head: Normocephalic and atraumatic. Psychiatric: She has a normal mood and affect.  Her behavior is normal. Judgment and thought content normal.     Reviewed most recent labs at time of visit:   Results for orders placed or performed in visit on 60/79/51   METABOLIC PANEL, COMPREHENSIVE   Result Value Ref Range    Glucose 84 65 - 99 mg/dL    BUN 14 6 - 24 mg/dL    Creatinine 0.69 0.57 - 1.00 mg/dL    GFR est non- >59 mL/min/1.73    GFR est  >59 mL/min/1.73    BUN/Creatinine ratio 20 9 - 23    Sodium 141 134 - 144 mmol/L    Potassium 4.6 3.5 - 5.2 mmol/L    Chloride 101 96 - 106 mmol/L    CO2 22 18 - 29 mmol/L    Calcium 9.4 8.7 - 10.2 mg/dL    Protein, total 7.2 6.0 - 8.5 g/dL    Albumin 4.1 3.5 - 5.5 g/dL    GLOBULIN, TOTAL 3.1 1.5 - 4.5 g/dL    A-G Ratio 1.3 1.2 - 2.2    Bilirubin, total <0.2 0.0 - 1.2 mg/dL    Alk. phosphatase 84 39 - 117 IU/L    AST (SGOT) 24 0 - 40 IU/L    ALT (SGPT) 19 0 - 32 IU/L   C REACTIVE PROTEIN, QT   Result Value Ref Range    C-Reactive Protein, Qt 28.3 (H) 0.0 - 4.9 mg/L   CBC WITH AUTOMATED DIFF   Result Value Ref Range    WBC 5.8 3.4 - 10.8 x10E3/uL    RBC 4.34 3.77 - 5.28 x10E6/uL    HGB 12.9 11.1 - 15.9 g/dL    HCT 38.7 34.0 - 46.6 %    MCV 89 79 - 97 fL    MCH 29.7 26.6 - 33.0 pg    MCHC 33.3 31.5 - 35.7 g/dL    RDW 16.2 (H) 12.3 - 15.4 %    PLATELET 491 680 - 762 x10E3/uL    NEUTROPHILS 35 %    Lymphocytes 57 %    MONOCYTES 6 %    EOSINOPHILS 2 %    BASOPHILS 0 %    ABS. NEUTROPHILS 2.0 1.4 - 7.0 x10E3/uL    Abs Lymphocytes 3.3 (H) 0.7 - 3.1 x10E3/uL    ABS. MONOCYTES 0.4 0.1 - 0.9 x10E3/uL    ABS. EOSINOPHILS 0.1 0.0 - 0.4 x10E3/uL    ABS. BASOPHILS 0.0 0.0 - 0.2 x10E3/uL    IMMATURE GRANULOCYTES 0 %    ABS. IMM. GRANS. 0.0 0.0 - 0.1 x10E3/uL   LIPID PANEL   Result Value Ref Range    Cholesterol, total 252 (H) 100 - 199 mg/dL    Triglyceride 76 0 - 149 mg/dL    HDL Cholesterol 101 >39 mg/dL    VLDL, calculated 15 5 - 40 mg/dL    LDL, calculated 136 (H) 0 - 99 mg/dL   SED RATE (ESR)   Result Value Ref Range    Sed rate (ESR) 11 0 - 40 mm/hr     Body mass index is 41.15 kg/(m^2). ASSESSMENT and PLAN    ICD-10-CM ICD-9-CM    1. BMI 40.0-44.9, adult ([facility]) Z68.41 V85.41    2. Essential hypertension I10 401.9      Sofi Perez was seen today for weight management. Diagnoses and all orders for this visit:    BMI 40.0-44.9, adult ([facility]  - add    topiramate (TOPAMAX) 50 mg tablet; Take 1 Tab by mouth two (2) times a day. Reviewed with patient and educated regarding proper use of medication, side effects, potential adverse effects and when to follow up.    Also highly recommended she re-joins weight watchers which has been successful for her in the past and will help with portion control/calorie restriction and accountability. She is going to likely sign up this week. Recheck progress in 1-2 months. Patient verbalizes understanding of plan of care. Essential hypertension  bp is stable on current regimen. Follow-up Disposition:  Return for end of august med/weight fu .

## 2017-07-10 NOTE — MR AVS SNAPSHOT
Visit Information Date & Time Provider Department Dept. Phone Encounter #  
 7/10/2017  8:40 AM Yesenia Ireland, [facility] [address] [phone] [id number] Follow-up Instructions Return for end of august med/weight fu . Your Appointments 8/14/2017  9:00 AM  
ESTABLISHED PATIENT with Shayan Vasques MD  
Arthritis and [address] [place] [address] Road) Appt Note: 10 week fu; 10 week F/Up, Sr  
 [address] [facility] [id number]  
[phone]  
  
   
 [address] [id number] Upcoming Health Maintenance Date Due  
 BREAST CANCER SCRN MAMMOGRAM 11/18/2016 INFLUENZA AGE 9 TO ADULT 8/1/2017 PAP AKA CERVICAL CYTOLOGY 5/6/2018 DTaP/Tdap/Td series (2 - Td) 11/23/2021 COLONOSCOPY 7/25/2026 Allergies as of 7/10/2017  Review Complete On: 7/10/2017 By: Yesenia Ireland NP Severity Noted Reaction Type Reactions Percocet [Oxycodone-acetaminophen] High 05/21/2010   Systemic Hives Reglan [Metoclopramide] High 06/29/2015   Systemic Anaphylaxis, Anxiety Ace Inhibitors  03/09/2016    Cough Tramadol  06/11/2013    Itching Current Immunizations  Reviewed on 4/25/2017 Name Date Hepatitis B Vaccine 9/25/1998, 4/21/1998, 3/24/1998 Influenza High Dose Vaccine PF 10/28/2014 Influenza Vaccine (Quad) PF 9/26/2016 Influenza Vaccine PF 11/8/2013 Influenza Vaccine Split 11/12/2012, 11/23/2011 PPD 6/11/2012,  Incomplete, 9/14/2011, 9/7/2011 Pneumococcal Vaccine (Unspecified Type) 11/19/2012 TDAP Vaccine 11/23/2011 Not reviewed this visit You Were Diagnosed With   
  
 Codes Comments BMI 40.0-44.9, adult [facility])    -  Primary ICD-10-CM: Z68.41 
ICD-9-CM: V85.41 Essential hypertension     ICD-10-CM: I10 
ICD-9-CM: 401.9 Vitals BP Pulse Temp Resp Height(growth percentile) Weight(growth percentile) 138/72 68 97.7 °F (36.5 °C) (Oral) 16 5' 2\" (1.575 m) 225 lb (102.1 kg) LMP SpO2 BMI OB Status Smoking Status 04/29/2016 98% 41.15 kg/m2 Postmenopausal Never Smoker Vitals History BMI and BSA Data Body Mass Index Body Surface Area  
 41.15 kg/m 2 2.11 m 2 Preferred Pharmacy Pharmacy Name Phone [id number] [facility], [address] [place] [address] Your Updated Medication List  
  
   
This list is accurate as of: 7/10/17  9:07 AM.  Always use your most recent med list.  
  
  
  
  
 albuterol 90 mcg/actuation inhaler Commonly known as:  PROVENTIL HFA, VENTOLIN HFA, PROAIR HFA Take 2 Puffs by inhalation every six (6) hours as needed for Wheezing. ALPRAZolam 0.5 mg tablet Commonly known as:  XANAX  
1 tab po bid as needed for severe anxiety and/or sleep  
  
 amLODIPine 5 mg tablet Commonly known as:  Sherrye Acron Take 1 Tab by mouth daily. diclofenac EC 75 mg EC tablet Commonly known as:  VOLTAREN Take 1 Tab by mouth two (2) times a day. esomeprazole 40 mg capsule Commonly known as:  NexIUM Take 1 Cap by mouth daily. FLUoxetine 40 mg capsule Commonly known as:  PROzac Take 1 Cap by mouth daily. folic acid 1 mg tablet Commonly known as:  Google Take 1 Tab by mouth daily. gabapentin 100 mg capsule Commonly known as:  NEURONTIN  
2 caps po tid  
  
 hydroCHLOROthiazide 25 mg tablet Commonly known as:  HYDRODIURIL Take 1 Tab by mouth daily. methocarbamol 500 mg tablet Commonly known as:  ROBAXIN Take 1 Tab by mouth nightly as needed. methotrexate 2.5 mg tablet Commonly known as:  Margrette Lee Take 8 Tabs by mouth every Sunday. ondansetron 4 mg disintegrating tablet Commonly known as:  ZOFRAN ODT Take 1 Tab by mouth every eight (8) hours as needed for Nausea. oxybutynin 5 mg tablet Commonly known as:  UJEHZFDA  
 Take 1 Tab by mouth two (2) times a day. predniSONE 1 mg tablet Commonly known as:  DELTASONE  
TAKE 4 TABLETS BY MOUTH DAILY TAPER DOWN 1 TABLET EVERY 4 WEEKS IF TOLERATED. sulfaSALAzine  mg EC tablet Commonly known as:  AZULFIDINE EN-TABS Take 2 Tabs by mouth two (2) times a day. SUMAtriptan 50 mg tablet Commonly known as:  IMITREX Take 1 Tab by mouth once as needed for Migraine for 1 dose. topiramate 50 mg tablet Commonly known as:  TOPAMAX Take 1 Tab by mouth two (2) times a day. XELJANZ 5 mg tab Generic drug:  tofacitinib Take  by mouth two (2) times a day. Prescriptions Sent to Pharmacy Refills  
 hydroCHLOROthiazide (HYDRODIURIL) 25 mg tablet 11 Sig: Take 1 Tab by mouth daily. Class: Normal  
 Pharmacy: [address] Ph #: [phone] Route: Oral  
 topiramate (TOPAMAX) 50 mg tablet 11 Sig: Take 1 Tab by mouth two (2) times a day. Class: Normal  
 Pharmacy: [address] Ph #: [phone] Route: Oral  
  
Follow-up Instructions Return for end of august med/weight fu . Introducing [facility]! [facility] introduces [employer] patient portal. Now you can access parts of your medical record, email your doctor's office, and request medication refills online. 1. In your internet browser, go to [email or web address] 2. Click on the First Time User? Click Here link in the Sign In box. You will see the New Member Sign Up page. 3. Enter your [employer] Access Code exactly as it appears below. You will not need to use this code after youve completed the sign-up process. If you do not sign up before the expiration date, you must request a new code. · Ass[employer] Access Code: [email or web address] Expires: 8/19/2017  2:24 PM 
 
 4. Enter the last four digits of your Social Security Number (xxxx) and Date of Birth (mm/dd/yyyy) as indicated and click Submit. You will be taken to the next sign-up page. 5. Create a [employer] ID. This will be your [employer] login ID and cannot be changed, so think of one that is secure and easy to remember. 6. Create a [employer] password. You can change your password at any time. 7. Enter your Password Reset Question and Answer. This can be used at a later time if you forget your password. 8. Enter your e-mail address. You will receive e-mail notification when new information is available in [address] 9. Click Sign Up. You can now view and download portions of your medical record. 10. Click the Download Summary menu link to download a portable copy of your medical information. If you have questions, please visit the Frequently Asked Questions section of the [employer] website. Remember, [employer] is NOT to be used for urgent needs. For medical emergencies, dial 911. Now available from your iPhone and Android! Please provide this summary of care documentation to your next provider. Your primary care clinician is listed as Yesenia Ireland. If you have any questions after today's visit, please call [phone].

## 2017-07-10 NOTE — PROGRESS NOTES
Pt presents to office today for a follow up Weight management and Cholesterol problem. Chief Complaint   Patient presents with    Weight Management     Follow up. 1. Have you been to the ER, urgent care clinic since your last visit? Hospitalized since your last visit? No    2. Have you seen or consulted any other health care providers outside of the [address] [place] [address] since your last visit? Include any pap smears or colon screening.  No

## 2017-07-20 NOTE — TELEPHONE ENCOUNTER
----- Message from Loida Arrington MD sent at 7/20/2017 12:05 PM EDT -----  I have refilled prednisone. Please confirm current dosing and have her get CBC, CMP, ESR, CRP soon.

## 2017-08-14 PROBLEM — M89.9 DISORDER OF BONE AND CARTILAGE, UNSPECIFIED: Status: ACTIVE | Noted: 2017-01-01

## 2017-08-14 PROBLEM — M94.9 DISORDER OF BONE AND CARTILAGE, UNSPECIFIED: Status: ACTIVE | Noted: 2017-01-01

## 2017-08-14 NOTE — PROGRESS NOTES
HISTORY OF PRESENT ILLNESS  Kyalie Melo is a 46 y.o. female. HPI MsRene Payne presents for follow up of rheumatoid arthritis. She has pain mainly in the knees. She relates joint stiffness ( 30-45 minutes in the AM ). She has no joint swelling. She has not had fevers. She has been taking prednisone 5 mg daily (tapered from 4 mg daily directly to 1 mg daily with flare). She is taking methotrexate 20 mg weekly. She is taking sulfasalazine 500 mg daily (migraines with higher dosing). She is taking Xeljanz 5 mg twice daily. She takes diclofenac 75 mg perhaps weekly. She is working on losing weight. She is trying to walk more frequently/ being more active. Of note, she has persistent difficulty swallowing large pills and solids. Current Outpatient Prescriptions   Medication Sig Dispense Refill    XELJANZ 5 mg tab Take 1 tablet by mouth twice a day as directed by physician. 60 Tab 5    amLODIPine (NORVASC) 5 mg tablet TAKE 1 TABLET BY MOUTH EVERY DAY 30 Tab 3    hydroCHLOROthiazide (HYDRODIURIL) 25 mg tablet Take 1 Tab by mouth daily. 30 Tab 11    topiramate (TOPAMAX) 50 mg tablet Take 1 Tab by mouth two (2) times a day. 60 Tab 11    sulfaSALAzine EC (AZULFIDINE EN-TABS) 500 mg EC tablet Take 2 Tabs by mouth two (2) times a day. (Patient taking differently: Take 1,000 mg by mouth two (2) times a day. Takes 1 tab daily.) 120 Tab 5    methotrexate (RHEUMATREX) 2.5 mg tablet Take 8 Tabs by mouth every Sunday. 36 Tab 3    diclofenac EC (VOLTAREN) 75 mg EC tablet Take 1 Tab by mouth two (2) times a day. 30 Tab 5    folic acid (FOLVITE) 1 mg tablet Take 1 Tab by mouth daily. 90 Tab 3    gabapentin (NEURONTIN) 100 mg capsule 2 caps po tid 90 Cap 5    albuterol (PROVENTIL HFA, VENTOLIN HFA, PROAIR HFA) 90 mcg/actuation inhaler Take 2 Puffs by inhalation every six (6) hours as needed for Wheezing. 1 Inhaler 0    amLODIPine (NORVASC) 5 mg tablet Take 1 Tab by mouth daily.  30 Tab 5    methocarbamol (ROBAXIN) 500 mg tablet Take 1 Tab by mouth nightly as needed. 30 Tab 1    oxybutynin (DITROPAN) 5 mg tablet Take 1 Tab by mouth two (2) times a day. 60 Tab 5    FLUoxetine (PROZAC) 40 mg capsule Take 1 Cap by mouth daily. 30 Cap 5    ALPRAZolam (XANAX) 0.5 mg tablet 1 tab po bid as needed for severe anxiety and/or sleep 60 Tab 0    ondansetron (ZOFRAN ODT) 4 mg disintegrating tablet Take 1 Tab by mouth every eight (8) hours as needed for Nausea. 10 Tab 0    SUMAtriptan (IMITREX) 50 mg tablet Take 1 Tab by mouth once as needed for Migraine for 1 dose. 15 Tab 3    esomeprazole (NEXIUM) 40 mg capsule Take 1 Cap by mouth daily. 30 Cap 3     Allergies   Allergen Reactions    Percocet [Oxycodone-Acetaminophen] Hives    Reglan [Metoclopramide] Anaphylaxis and Anxiety    Ace Inhibitors Cough    Tramadol Itching       Review of Systems   Constitutional: Negative for fever. Eyes: Negative for blurred vision. Cardiovascular: Positive for leg swelling. Gastrointestinal:        +dysphagia   Endo/Heme/Allergies: Bruises/bleeds easily. Physical Exam   Vitals reviewed. GENERAL: well developed; well nourished; well groomed; no apparent distress;   E/N/T: Lips/Teeth/Gums: poor dentition. No areas exposed jaw bone; Oropharynx: normal mucosa, palate, and posterior pharynx;    NECK: Neck is supple with FROM. RESPIRATORY: Lungs clear with BS=B/L  CARDIOVASCULAR: regular rhythm ; 2/6 SUDARSHAN base base; trace pedal edema    GASTROINTESTINAL: no tenderness; no organomegaly    LYMPHATIC: no enlargement of cervical nodes;    MUSCULOSKELETAL:  full peripheral joint exam with tenderness of the knees. No raegan synovitis. Knees 90 degrees flexion with stable joints; ulnar deviation and subluxation (reducible) at left>right MCPS.   NEURO: A/O X3; Tone normal; gait limping, using a cane  SKIN: no ulcerations or rashes; no skin thickening, induration, or subcutaneous nodules  Lab Results   Component Value Date/Time    WBC 5.8 04/25/2017 11:32 AM    WBC 9.4 06/29/2012 03:21 PM    HGB 12.9 04/25/2017 11:32 AM    HCT 38.7 04/25/2017 11:32 AM    PLATELET 699 65/30/1658 11:32 AM    MCV 89 04/25/2017 11:32 AM     Lab Results   Component Value Date/Time    Sodium 141 04/25/2017 11:32 AM    Potassium 4.6 04/25/2017 11:32 AM    Chloride 101 04/25/2017 11:32 AM    CO2 22 04/25/2017 11:32 AM    Anion gap 9 06/24/2015 07:45 AM    Glucose 84 04/25/2017 11:32 AM    BUN 14 04/25/2017 11:32 AM    Creatinine 0.69 04/25/2017 11:32 AM    BUN/Creatinine ratio 20 04/25/2017 11:32 AM    GFR est  04/25/2017 11:32 AM    GFR est non- 04/25/2017 11:32 AM    Calcium 9.4 04/25/2017 11:32 AM    Bilirubin, total <0.2 04/25/2017 11:32 AM    AST (SGOT) 24 04/25/2017 11:32 AM    Alk. phosphatase 84 04/25/2017 11:32 AM    Protein, total 7.2 04/25/2017 11:32 AM    Albumin 4.1 04/25/2017 11:32 AM    Globulin 3.8 06/24/2015 07:45 AM    A-G Ratio 1.3 04/25/2017 11:32 AM    ALT (SGPT) 19 04/25/2017 11:32 AM     Lab Results   Component Value Date/Time    Sed rate (ESR) 11 04/25/2017 11:32 AM     Lab Results   Component Value Date/Time    Cholesterol, total 252 04/25/2017 11:32 AM    HDL Cholesterol 101 04/25/2017 11:32 AM    LDL, calculated 136 04/25/2017 11:32 AM    VLDL, calculated 15 04/25/2017 11:32 AM    Triglyceride 76 04/25/2017 11:32 AM     DXA May: femoral neck T-score -1.3  MHAQ 1.000  ASSESSMENT and PLAN    ICD-10-CM ICD-9-CM    1. Rheumatoid arthritis involving multiple sites, unspecified rheumatoid factor presence ([facility]): reducible ulnar deviation on exam. Symptoms were controlled with prednisone 5 mg daily, methotrexate 20 mg weekly, and Enbrel 50 mg weekly, along with SSZ 1 gram BID. Symptoms had flared and she had stopped Enbrel. She began [place] 5 mg bid in January 2017. She has taken  mg once daily due to headaches after taking the medication. She had dropped prednisone from 4 mg daily to 1 mg daily and returned to 5 mg daily after a flare.  RA seems well controlled at present. M06.9 714.0 C REACTIVE PROTEIN, QT  Xeljanz 5 mg BID  Methotrexate 20 mg weekly   mg daily. If frequent difficulty swallowing this, call (and we will d/c this)  Prednisone 4 mg daily, tapering by 1 mg every 2-4 weeks if tolerated      SED RATE (ESR)   2. Long-term use of immunosuppressant medication M78.226 F15.66 METABOLIC PANEL, COMPREHENSIVE      CBC WITH AUTOMATED DIFF   3. Long term current use of systemic steroids Z79.52 V58.65 See below  Slow prednisone taper reinforced    4. Osteopenia: DXA femoral neck T-score -1.3. M89.9 733.90 -calcium 1200 mg daily total  -vitamin D3 2000 units BID for 2 months and then 2000 units monthly    M94.9     5. Dysphagia, unspecified type: persistent for some pills and solids R13.10 787.20 REFERRAL TO GASTROENTEROLOGY   6. Osteoarthritis of both knees, unspecified osteoarthritis type: no significant benefit from PT or injections. She sees orthopedics at [facility] (was told to get weight to 180 pounds in order to consider replacements).  M17.0 715.96 -encouraged current weight loss efforts  -comfortable, safe, low impact exercise encouraged  -if acute flares (recent increase in right knee symptoms) or instability, review with [facility] orthopedics

## 2017-08-14 NOTE — PATIENT INSTRUCTIONS
Vitamin D3 2000 units twice daily for 2 months and then 2000 units once daily    Continue current medications    Call if continued difficulty with sulfasalazine    GI doctor for swallowing difficulty    Prednisone Instead of 5 mg dose. Take 4 mg (four one mg tabs) once daily. Lower by 1 mg every 2-4 weeks if tolerated (3 mg daily in 2-4 weeks, then 2 mg daily, then 1 mg daily).  Stay at lowest effective dose

## 2017-08-14 NOTE — MR AVS SNAPSHOT
Visit Information Date & Time Provider Department Dept. Phone Encounter #  
 8/14/2017  9:00 AM Juan Pablo Tinoco  [facility] [id number] Follow-up Instructions Return in about 3 months (around 11/14/2017). Your Appointments 8/28/2017 11:30 AM  
ROUTINE CARE with Jessie Peña  [facility] ([facility]) Appt Note: 1 month follow up; 1 month follow up/Lucy pt/clementedp/08/09/17; 1 month follow up/Lucy pt/clementedp/08/09/17  
 [address] [phone]  
[phone]  
  
   
 [address] [phone] Upcoming Health Maintenance Date Due  
 BREAST CANCER SCRN MAMMOGRAM 11/18/2016 INFLUENZA AGE 9 TO ADULT 8/1/2017 PAP AKA CERVICAL CYTOLOGY 5/6/2018 DTaP/Tdap/Td series (2 - Td) 11/23/2021 COLONOSCOPY 7/25/2026 Allergies as of 8/14/2017  Review Complete On: 8/14/2017 By: Juan Pablo Tinoco MD  
  
 Severity Noted Reaction Type Reactions Percocet [Oxycodone-acetaminophen] High 05/21/2010   Systemic Hives Reglan [Metoclopramide] High 06/29/2015   Systemic Anaphylaxis, Anxiety Ace Inhibitors  03/09/2016    Cough Tramadol  06/11/2013    Itching Current Immunizations  Reviewed on 4/25/2017 Name Date Hepatitis B Vaccine 9/25/1998, 4/21/1998, 3/24/1998 Influenza High Dose Vaccine PF 10/28/2014 Influenza Vaccine (Quad) PF 9/26/2016 Influenza Vaccine PF 11/8/2013 Influenza Vaccine Split 11/12/2012, 11/23/2011 PPD 6/11/2012,  Incomplete, 9/14/2011, 9/7/2011 TDAP Vaccine 11/23/2011 ZZZ-RETIRED (DO NOT USE) Pneumococcal Vaccine (Unspecified Type) 11/19/2012 Not reviewed this visit You Were Diagnosed With   
  
 Codes Comments Rheumatoid arthritis involving multiple sites, unspecified rheumatoid factor presence ([facility]    -  Primary ICD-10-CM: M06.9 ICD-9-CM: 714.0 Long-term use of immunosuppressant medication     ICD-10-CM: Z79.899 ICD-9-CM: V58.69 Long term current use of systemic steroids     ICD-10-CM: Z79.52 
ICD-9-CM: V58.65 Disorder of bone and cartilage, unspecified     ICD-10-CM: M89.9, M94.9 ICD-9-CM: 733.90 Dysphagia, unspecified type     ICD-10-CM: R13.10 ICD-9-CM: 787.20 Osteoarthritis of both knees, unspecified osteoarthritis type     ICD-10-CM: M17.0 ICD-9-CM: 715.96 Vitals BP Pulse Temp Resp Height(growth percentile) Weight(growth percentile) 139/68 (BP 1 Location: Left arm, BP Patient Position: Sitting) 63 97.6 °F (36.4 °C) (Oral) 20 5' 2\" (1.575 m) 218 lb 9.6 oz (99.2 kg) LMP SpO2 BMI OB Status Smoking Status 04/29/2016 96% 39.98 kg/m2 Postmenopausal Never Smoker Vitals History BMI and BSA Data Body Mass Index Body Surface Area  
 39.98 kg/m 2 2.08 m 2 Preferred Pharmacy Pharmacy Name Phone [phone], [address] AT [place] [address] [phone] Your Updated Medication List  
  
   
This list is accurate as of: 8/14/17  9:30 AM.  Always use your most recent med list.  
  
  
  
  
 albuterol 90 mcg/actuation inhaler Commonly known as:  PROVENTIL HFA, VENTOLIN HFA, PROAIR HFA Take 2 Puffs by inhalation every six (6) hours as needed for Wheezing. ALPRAZolam 0.5 mg tablet Commonly known as:  XANAX  
1 tab po bid as needed for severe anxiety and/or sleep * amLODIPine 5 mg tablet Commonly known as:  Lew Shield Take 1 Tab by mouth daily. * amLODIPine 5 mg tablet Commonly known as:  Beadle Shield TAKE 1 TABLET BY MOUTH EVERY DAY  
  
 diclofenac EC 75 mg EC tablet Commonly known as:  VOLTAREN Take 1 Tab by mouth two (2) times a day. esomeprazole 40 mg capsule Commonly known as:  NexIUM Take 1 Cap by mouth daily. FLUoxetine 40 mg capsule Commonly known as:  PROzac  
 Take 1 Cap by mouth daily. folic acid 1 mg tablet Commonly known as:  Google Take 1 Tab by mouth daily. gabapentin 100 mg capsule Commonly known as:  NEURONTIN  
2 caps po tid  
  
 hydroCHLOROthiazide 25 mg tablet Commonly known as:  HYDRODIURIL Take 1 Tab by mouth daily. methocarbamol 500 mg tablet Commonly known as:  ROBAXIN Take 1 Tab by mouth nightly as needed. methotrexate 2.5 mg tablet Commonly known as:  Cannon Raja Take 8 Tabs by mouth every Sunday. ondansetron 4 mg disintegrating tablet Commonly known as:  ZOFRAN ODT Take 1 Tab by mouth every eight (8) hours as needed for Nausea. oxybutynin 5 mg tablet Commonly known as:  WZMXEZNX Take 1 Tab by mouth two (2) times a day. predniSONE 1 mg tablet Commonly known as:  Dominic Od[place] Instead of 5 mg dose. Take 4 mg once daily. Lower by 1 mg every 2-4 weeks if tolerated. sulfaSALAzine  mg EC tablet Commonly known as:  AZULFIDINE EN-TABS Take 2 Tabs by mouth two (2) times a day. SUMAtriptan 50 mg tablet Commonly known as:  IMITREX Take 1 Tab by mouth once as needed for Migraine for 1 dose. topiramate 50 mg tablet Commonly known as:  TOPAMAX Take 1 Tab by mouth two (2) times a day. XELJANZ 5 mg tab Generic drug:  tofacitinib Take 1 tablet by mouth twice a day as directed by physician. * Notice: This list has 2 medication(s) that are the same as other medications prescribed for you. Read the directions carefully, and ask your doctor or other care provider to review them with you. Prescriptions Sent to Pharmacy Refills  
 predniSONE (DELTASONE) 1 mg tablet 4 Sig: Instead of 5 mg dose. Take 4 mg once daily. Lower by 1 mg every 2-4 weeks if tolerated. Class: Normal  
 Pharmacy: [facility] [address] [place] [address] [address] [place] [address] Ph #: [phone] We Performed the Following C REACTIVE PROTEIN, QT [62669 CPT(R)] CBC WITH AUTOMATED DIFF [45106 CPT(R)] METABOLIC PANEL, COMPREHENSIVE [66505 CPT(R)] REFERRAL TO GASTROENTEROLOGY [VOF51 Custom] SED RATE (ESR) [id number] CPT(R)] Follow-up Instructions Return in about 3 months (around 11/14/2017). To-Do List   
 08/22/2017 10:15 AM  
  Appointment with [facility] KRISTINA 1 at [address] [place] [address] ([phone]) Shower or bathe using soap and water. Do not use deodorant, powder, perfumes, or lotion the day of your exam.  If your prior mammograms were not performed at [facility] 6 please bring films with you or forward prior images 2 days before your procedure. Check in at registration 15min before your appointment time unless you were instructed to do otherwise. A script is not necessary, but if you have one, please bring it on the day of the mammogram or have it faxed to the department. [facility] [phone] [facility]  [phone] [facility]  [phone] [facility] [phone] [address] [phone] Referral Information Referral ID Referred By Referred To  
  
 [id number] Richard MEDEL MD   
   [address] [facility] [address] [place] [address] Phone: [phone] Fax: [phone] Visits Status Start Date End Date 1 New Request 8/14/17 8/14/18 If your referral has a status of pending review or denied, additional information will be sent to support the outcome of this decision. Patient Instructions Vitamin D3 2000 units twice daily for 2 months and then 2000 units once daily Continue current medications Call if continued difficulty with sulfasalazine GI doctor for swallowing difficulty Prednisone Instead of 5 mg dose. Take 4 mg (four one mg tabs) once daily. Lower by 1 mg every 2-4 weeks if tolerated (3 mg daily in 2-4 weeks, then 2 mg daily, then 1 mg daily). Stay at lowest effective dose Introducing [facility]! [facility] introduces [employer] patient portal. Now you can access parts of your medical record, email your doctor's office, and request medication refills online. 1. In your internet browser, go to [email or web address] 2. Click on the First Time User? Click Here link in the Sign In box. You will see the New Member Sign Up page. 3. Enter your Gifi Access Code exactly as it appears below. You will not need to use this code after youve completed the sign-up process. If you do not sign up before the expiration date, you must request a new code. · Gifi Access Code: [email or web address] Expires: 8/19/2017  2:24 PM 
 
4. Enter the last four digits of your Social Security Number (xxxx) and Date of Birth (mm/dd/yyyy) as indicated and click Submit. You will be taken to the next sign-up page. 5. Create a Gifi ID. This will be your Gifi login ID and cannot be changed, so think of one that is secure and easy to remember. 6. Create a Gifi password. You can change your password at any time. 7. Enter your Password Reset Question and Answer. This can be used at a later time if you forget your password. 8. Enter your e-mail address. You will receive e-mail notification when new information is available in [address] 9. Click Sign Up. You can now view and download portions of your medical record. 10. Click the Download Summary menu link to download a portable copy of your medical information. If you have questions, please visit the Frequently Asked Questions section of the [employer]ifi website. Remember, [employer]ifi is NOT to be used for urgent needs. For medical emergencies, dial 911. Now available from your iPhone and Android! Please provide this summary of care documentation to your next provider. Your primary care clinician is listed as Eveline Cabezas. If you have any questions after today's visit, please call [phone].

## 2017-08-16 NOTE — TELEPHONE ENCOUNTER
Message  Received: Today       MD Brionna Salguero LPN       Cc: Hilda Phoenix, NP                     Slightly increased inflammation marker (improved from past checks). Notified patient of results above per Dr. Claudio Alarcon,  She confirmed her understanding.

## 2017-08-16 NOTE — TELEPHONE ENCOUNTER
----- Message from Gardenia Pacheco MD sent at 8/16/2017  8:41 AM EDT -----  Slightly increased inflammation marker (improved from past checks).

## 2017-10-19 NOTE — TELEPHONE ENCOUNTER
Returned call to patient inquiring about rescheduling her appointment time with Dr. Bert Cushing. Advised patient that Dr. Bert Cushing is out of the office indefinitely at this time. Patient stated she is having some problems and the medication is not working. She wanted an increase in the Prednisone. I told patient I would send a message back to have the nurse call to evaluate.

## 2017-10-19 NOTE — TELEPHONE ENCOUNTER
----- Message from Husam Schrader sent at 10/19/2017  7:39 AM EDT -----  Regarding: /Telephone  Pt called to schedule an appt for November 6, 2017 for 10 week follow up appt. Pt wish to be seen after ten or before one in the afternoon. Pt best contact number is [phone].

## 2017-10-24 NOTE — TELEPHONE ENCOUNTER
Called patient and lm to call office to schedule appointment with Dr. Andie Perry. Okay per Dr. Andie Perry.

## 2017-11-06 PROBLEM — R60.9 DEPENDENT EDEMA: Status: ACTIVE | Noted: 2017-01-01

## 2017-11-06 PROBLEM — F32.A DEPRESSION: Status: ACTIVE | Noted: 2017-01-01

## 2017-11-06 NOTE — PROGRESS NOTES
Chief Complaint   Patient presents with    Hypertension     follow up    Weight Management     follow up     1. Have you been to the ER, urgent care clinic since your last visit? Hospitalized since your last visit? No    2. Have you seen or consulted any other health care providers outside of the [address] [place] [address] since your last visit? Include any pap smears or colon screening.  No

## 2017-11-06 NOTE — PROGRESS NOTES
HISTORY OF PRESENT ILLNESS  Colin Baig is a 46 y.o. female. HPI  Follow up chronic health problems. HTN. Taking medication as prescribed. Following healthy diet, no regular exercise due to arthritis sx, mainly left knee pain. Hypercholesterolemia. Diet controlled. RA. Followed by Dr. Gabby Burnett. Began Alphonza Kales about 1 year ago. Does not find it very effective. Having generalized arthralgias. Requesting to bump up prednisone dose. Maintenance dose is 1mg daily. Sometimes increases to 5mg daily when arthritis flares. She will be following up with another rheumatologist due to Dr. Gabby Burnett being out of practice for awhile. She has no follow up appointment scheduled as of yet. Depression. Admits to not taking prozac consistently. Feeling down at times. GERD. Taking nexium as prescribed. Requesting refill on diflucan for occasional vaginitis due to steroid use.   Past Medical History:   Diagnosis Date    Aortic valve insufficiency 4/25/2016    Bilateral ovarian cysts 6/24/2015    Cataract     Coagulation defects     recently anemic    Fe deficiency anemia     Gastrointestinal disorder     GERD    GERD (gastroesophageal reflux disease)     Hypertension     Iritis     Migraine without status migrainosus, not intractable 6/6/2016    Pulmonary embolism (HCC)     RA (rheumatoid arthritis) (HCC)     Thromboembolus (HCC) 4-5-12    right calf    Thyroid disease     nodules on thyroid     Patient Active Problem List   Diagnosis Code    GERD (gastroesophageal reflux disease) K21.9    Essential hypertension, benign I10    Iritis, chronic, in other disease     Gastrointestinal disorder K92.9    RA (rheumatoid arthritis) ([facility] M06.9    Undiagnosed cardiac murmurs R01.1    Long-term use of immunosuppressant medication Z79.899    Long term current use of systemic steroids Z79.52    Thromboembolus (HCC) I74.9    Iron deficiency anemia D50.9    Carpal tunnel syndrome G56.00    Aortic valve insufficiency I35.1    Migraine without status migrainosus, not intractable G43.909    osteopenia M89.9, M94.9    Depression F32.9    Dependent edema R60.9     Current Outpatient Prescriptions   Medication Sig    predniSONE (DELTASONE) 1 mg tablet Take 5 mg daily. Lower by 1 mg every 1-2 weeks until maintenance of 1 mg daily.  fluconazole (DIFLUCAN) 150 mg tablet take 1 tablet by mouth as a single dose    XELJANZ 5 mg tab Take 1 tablet by mouth twice a day as directed by physician.  hydroCHLOROthiazide (HYDRODIURIL) 25 mg tablet Take 1 Tab by mouth daily.  topiramate (TOPAMAX) 50 mg tablet Take 1 Tab by mouth two (2) times a day.  sulfaSALAzine EC (AZULFIDINE EN-TABS) 500 mg EC tablet Take 2 Tabs by mouth two (2) times a day. (Patient taking differently: Take 1,000 mg by mouth two (2) times a day. Takes 1 tab daily.)    methotrexate (RHEUMATREX) 2.5 mg tablet Take 8 Tabs by mouth every Sunday.  diclofenac EC (VOLTAREN) 75 mg EC tablet Take 1 Tab by mouth two (2) times a day.  folic acid (FOLVITE) 1 mg tablet Take 1 Tab by mouth daily.  albuterol (PROVENTIL HFA, VENTOLIN HFA, PROAIR HFA) 90 mcg/actuation inhaler Take 2 Puffs by inhalation every six (6) hours as needed for Wheezing.  amLODIPine (NORVASC) 5 mg tablet Take 1 Tab by mouth daily.  methocarbamol (ROBAXIN) 500 mg tablet Take 1 Tab by mouth nightly as needed.  oxybutynin (DITROPAN) 5 mg tablet Take 1 Tab by mouth two (2) times a day.  FLUoxetine (PROZAC) 40 mg capsule Take 1 Cap by mouth daily.  ALPRAZolam (XANAX) 0.5 mg tablet 1 tab po bid as needed for severe anxiety and/or sleep    ondansetron (ZOFRAN ODT) 4 mg disintegrating tablet Take 1 Tab by mouth every eight (8) hours as needed for Nausea.  SUMAtriptan (IMITREX) 50 mg tablet Take 1 Tab by mouth once as needed for Migraine for 1 dose.  esomeprazole (NEXIUM) 40 mg capsule Take 1 Cap by mouth daily.      No current facility-administered medications for this visit. Social History   Substance Use Topics    Smoking status: Never Smoker    Smokeless tobacco: Never Used    Alcohol use No     Visit Vitals    /57 (BP 1 Location: Right arm, BP Patient Position: Sitting)    Pulse 82    Temp 98.2 °F (36.8 °C) (Oral)    Resp 18    Ht 5' 2\" (1.575 m)    Wt 204 lb 12.8 oz (92.9 kg)    BMI 37.46 kg/m2     Review of Systems   Constitutional: Negative for chills and fever. Respiratory: Negative for cough and shortness of breath. Cardiovascular: Positive for leg swelling (intermittent). Negative for chest pain and palpitations. Musculoskeletal: Positive for joint pain. Neurological: Negative for weakness. Psychiatric/Behavioral: Positive for depression. Negative for hallucinations, substance abuse and suicidal ideas. The patient is not nervous/anxious and does not have insomnia. All other systems reviewed and are negative. Physical Exam   Constitutional: No distress. Overweight. Neck: Neck supple. Cardiovascular: Normal rate, regular rhythm and normal heart sounds. Pulmonary/Chest: Effort normal and breath sounds normal.   Abdominal: Soft. She exhibits no distension. There is no tenderness. There is no guarding. Musculoskeletal: She exhibits no edema. Lymphadenopathy:     She has no cervical adenopathy. Neurological: She is alert. Skin: Skin is warm and dry. Psychiatric: She has a normal mood and affect. Her behavior is normal. Thought content normal.       ASSESSMENT and PLAN  Diagnoses and all orders for this visit:    1. Essential hypertension, benign  -     METABOLIC PANEL, COMPREHENSIVE  Controlled. Continue current treatment. 2. Gastroesophageal reflux disease without esophagitis  Stable on nexium. 3. Dependent edema  Clinically stable. 4. Acute vaginitis  -     fluconazole (DIFLUCAN) 150 mg tablet; take 1 tablet by mouth as a single dose  May use prn for yeast vaginitis secondary to prednisone use.     5. Rheumatoid arthritis involving ankle, unspecified laterality, unspecified rheumatoid factor presence (HCC)  -     predniSONE (DELTASONE) 1 mg tablet; Take 5 mg daily. Lower by 1 mg every 1-2 weeks until maintenance of 1 mg daily.  -     FL HANDLG&/OR CONVEY OF SPEC FOR TR OFFICE TO LAB  -     FL COLLECTION VENOUS BLOOD,VENIPUNCTURE  -     CBC WITH AUTOMATED DIFF  -     METABOLIC PANEL, COMPREHENSIVE  -     C REACTIVE PROTEIN, QT  -     SED RATE (ESR)  Exacerbation on xeljanz. Will refill prednisone with instructions to taper weekly. Advised not to maintain higher doses for extended period of time due to potential side effects. Strongly encouraged follow up with rheumatology. 6. Depression, unspecified depression type  Moderate symptom control. Advised taking prozac on consistent basis for maximum effectiveness. Follow up 4 months or sooner prn.

## 2017-11-06 NOTE — MR AVS SNAPSHOT
Visit Information Date & Time Provider Department Dept. Phone Encounter #  
 11/6/2017 10:00 AM Candie Steele  [facility] [phone] [id number] Upcoming Health Maintenance Date Due  
 BREAST CANCER SCRN MAMMOGRAM 11/18/2016 INFLUENZA AGE 9 TO ADULT 8/1/2017 PAP AKA CERVICAL CYTOLOGY 5/6/2018 DTaP/Tdap/Td series (2 - Td) 11/23/2021 COLONOSCOPY 7/25/2026 Allergies as of 11/6/2017  Review Complete On: 11/6/2017 By: Candie Steele NP Severity Noted Reaction Type Reactions Percocet [Oxycodone-acetaminophen] High 05/21/2010   Systemic Hives Reglan [Metoclopramide] High 06/29/2015   Systemic Anaphylaxis, Anxiety Ace Inhibitors  03/09/2016    Cough Tramadol  06/11/2013    Itching Current Immunizations  Reviewed on 4/25/2017 Name Date Hepatitis B Vaccine 9/25/1998, 4/21/1998, 3/24/1998 Influenza High Dose Vaccine PF 10/28/2014 Influenza Vaccine (Quad) PF 9/26/2016 Influenza Vaccine PF 11/8/2013 Influenza Vaccine Split 11/12/2012, 11/23/2011 PPD 6/11/2012,  Incomplete, 9/14/2011, 9/7/2011 TDAP Vaccine 11/23/2011 ZZZ-RETIRED (DO NOT USE) Pneumococcal Vaccine (Unspecified Type) 11/19/2012 Not reviewed this visit You Were Diagnosed With   
  
 Codes Comments Essential hypertension, benign    -  Primary ICD-10-CM: I10 
ICD-9-CM: 401.1 Gastroesophageal reflux disease without esophagitis     ICD-10-CM: K21.9 ICD-9-CM: 530.81 Dependent edema     ICD-10-CM: R60.9 ICD-9-CM: 740. 3 Acute vaginitis     ICD-10-CM: N76.0 ICD-9-CM: 616.10 Rheumatoid arthritis involving ankle, unspecified laterality, unspecified rheumatoid factor presence (HCC)     ICD-10-CM: M06.9 ICD-9-CM: 714.0 Vitals BP Pulse Temp Resp Height(growth percentile) Weight(growth percentile)  136/57 (BP 1 Location: Right arm, BP Patient Position: Sitting) 82 98.2 °F (36.8 °C) (Oral) 18 5' 2\" (1.575 m) 204 lb 12.8 oz (92.9 kg) LMP BMI OB Status Smoking Status 04/29/2016 37.46 kg/m2 Postmenopausal Never Smoker BMI and BSA Data Body Mass Index Body Surface Area  
 37.46 kg/m 2 2.02 m 2 Preferred Pharmacy Pharmacy Name Phone [id number] [facility], [address] [place] [address] Your Updated Medication List  
  
   
This list is accurate as of: 11/6/17 10:43 AM.  Always use your most recent med list.  
  
  
  
  
 albuterol 90 mcg/actuation inhaler Commonly known as:  PROVENTIL HFA, VENTOLIN HFA, PROAIR HFA Take 2 Puffs by inhalation every six (6) hours as needed for Wheezing. ALPRAZolam 0.5 mg tablet Commonly known as:  XANAX  
1 tab po bid as needed for severe anxiety and/or sleep  
  
 amLODIPine 5 mg tablet Commonly known as:  [place] Take 1 Tab by mouth daily. diclofenac EC 75 mg EC tablet Commonly known as:  VOLTAREN Take 1 Tab by mouth two (2) times a day. esomeprazole 40 mg capsule Commonly known as:  NexIUM Take 1 Cap by mouth daily. fluconazole 150 mg tablet Commonly known as:  DIFLUCAN  
take 1 tablet by mouth as a single dose FLUoxetine 40 mg capsule Commonly known as:  PROzac Take 1 Cap by mouth daily. folic acid 1 mg tablet Commonly known as:  Google Take 1 Tab by mouth daily. hydroCHLOROthiazide 25 mg tablet Commonly known as:  HYDRODIURIL Take 1 Tab by mouth daily. methocarbamol 500 mg tablet Commonly known as:  ROBAXIN Take 1 Tab by mouth nightly as needed. methotrexate 2.5 mg tablet Commonly known as:  Lea Schimke Take 8 Tabs by mouth every Sunday. ondansetron 4 mg disintegrating tablet Commonly known as:  ZOFRAN ODT Take 1 Tab by mouth every eight (8) hours as needed for Nausea. oxybutynin 5 mg tablet Commonly known as:  BGLKAHPL Take 1 Tab by mouth two (2) times a day. predniSONE 1 mg tablet Commonly known as:  Yessy Ian Take 5 mg daily. Lower by 1 mg every 1-2 weeks until maintenance of 1 mg daily. sulfaSALAzine  mg EC tablet Commonly known as:  AZULFIDINE EN-TABS Take 2 Tabs by mouth two (2) times a day. SUMAtriptan 50 mg tablet Commonly known as:  IMITREX Take 1 Tab by mouth once as needed for Migraine for 1 dose. topiramate 50 mg tablet Commonly known as:  TOPAMAX Take 1 Tab by mouth two (2) times a day. XELJANZ 5 mg tab Generic drug:  tofacitinib Take 1 tablet by mouth twice a day as directed by physician. Prescriptions Sent to Pharmacy Refills  
 predniSONE (DELTASONE) 1 mg tablet 1 Sig: Take 5 mg daily. Lower by 1 mg every 1-2 weeks until maintenance of 1 mg daily. Class: Normal  
 Pharmacy: [address] [place] [address] Ph #: [phone]  
 fluconazole (DIFLUCAN) 150 mg tablet 3 Sig: take 1 tablet by mouth as a single dose Class: Normal  
 Pharmacy: [address] Ph #: [phone] We Performed the Following C REACTIVE PROTEIN, QT [42568 CPT(R)] CBC WITH AUTOMATED DIFF [90188 CPT(R)] METABOLIC PANEL, COMPREHENSIVE [49057 CPT(R)] PA COLLECTION VENOUS BLOOD,VENIPUNCTURE [id number] CPT(R)] PA HANDLG&/OR CONVEY OF SPEC FOR TR OFFICE TO LAB [89872 CPT(R)] SED RATE (ESR) [id number] CPT(R)] Introducing [facility]! Rober Hollis introduces Myagi patient portal. Now you can access parts of your medical record, email your doctor's office, and request medication refills online. 1. In your internet browser, go to [email or web address] 2. Click on the First Time User? Click Here link in the Sign In box. You will see the New Member Sign Up page. 3. Enter your Myagi Access Code exactly as it appears below.  You will not need to use this code after youve completed the sign-up process. If you do not sign up before the expiration date, you must request a new code. · Giritech Access Code: [email or web address] Expires: 2/4/2018 10:31 AM 
 
4. Enter the last four digits of your Social Security Number (xxxx) and Date of Birth (mm/dd/yyyy) as indicated and click Submit. You will be taken to the next sign-up page. 5. Create a Giritech ID. This will be your Giritech login ID and cannot be changed, so think of one that is secure and easy to remember. 6. Create a Giritech password. You can change your password at any time. 7. Enter your Password Reset Question and Answer. This can be used at a later time if you forget your password. 8. Enter your e-mail address. You will receive e-mail notification when new information is available in [address] 9. Click Sign Up. You can now view and download portions of your medical record. 10. Click the Download Summary menu link to download a portable copy of your medical information. If you have questions, please visit the Frequently Asked Questions section of the Giritech website. Remember, Giritech is NOT to be used for urgent needs. For medical emergencies, dial 911. Now available from your iPhone and Android! Please provide this summary of care documentation to your next provider. Your primary care clinician is listed as Emilia Umanzor. If you have any questions after today's visit, please call [phone].

## 2017-12-29 NOTE — TELEPHONE ENCOUNTER
Pharmacy on file verified  Requested Prescriptions     Pending Prescriptions Disp Refills    predniSONE (DELTASONE) 1 mg tablet 120 Tab 1     Sig: Take 5 mg daily. Lower by 1 mg every 1-2 weeks until maintenance of 1 mg daily.

## 2018-01-01 ENCOUNTER — OFFICE VISIT (OUTPATIENT)
Dept: FAMILY MEDICINE CLINIC | Age: 53
End: 2018-01-01

## 2018-01-01 ENCOUNTER — OFFICE VISIT (OUTPATIENT)
Dept: ONCOLOGY | Age: 53
End: 2018-01-01

## 2018-01-01 ENCOUNTER — TELEPHONE (OUTPATIENT)
Dept: FAMILY MEDICINE CLINIC | Age: 53
End: 2018-01-01

## 2018-01-01 ENCOUNTER — HOSPITAL ENCOUNTER (OUTPATIENT)
Dept: LAB | Age: 53
Discharge: HOME | End: 2018-05-31
Payer: MEDICARE

## 2018-01-01 ENCOUNTER — TELEPHONE (OUTPATIENT)
Dept: ONCOLOGY | Age: 53
End: 2018-01-01

## 2018-01-01 ENCOUNTER — OFFICE VISIT (OUTPATIENT)
Dept: RHEUMATOLOGY | Age: 53
End: 2018-01-01

## 2018-01-01 ENCOUNTER — HOSPITAL ENCOUNTER (OUTPATIENT)
Dept: INFUSION THERAPY | Age: 53
Discharge: HOME | End: 2018-04-18

## 2018-01-01 ENCOUNTER — HOSPITAL ENCOUNTER (OUTPATIENT)
Dept: INFUSION THERAPY | Age: 53
Discharge: HOME | End: 2018-04-25

## 2018-01-01 VITALS
BODY MASS INDEX: 34.41 KG/M2 | SYSTOLIC BLOOD PRESSURE: 147 MMHG | WEIGHT: 187 LBS | HEART RATE: 92 BPM | DIASTOLIC BLOOD PRESSURE: 98 MMHG | TEMPERATURE: 98.2 F | RESPIRATION RATE: 18 BRPM | HEIGHT: 62 IN

## 2018-01-01 VITALS
RESPIRATION RATE: 20 BRPM | OXYGEN SATURATION: 94 % | BODY MASS INDEX: 38.28 KG/M2 | TEMPERATURE: 98.2 F | HEIGHT: 62 IN | WEIGHT: 208 LBS | SYSTOLIC BLOOD PRESSURE: 160 MMHG | HEART RATE: 88 BPM | DIASTOLIC BLOOD PRESSURE: 82 MMHG

## 2018-01-01 VITALS
DIASTOLIC BLOOD PRESSURE: 66 MMHG | TEMPERATURE: 97.9 F | HEIGHT: 62 IN | HEART RATE: 86 BPM | RESPIRATION RATE: 16 BRPM | SYSTOLIC BLOOD PRESSURE: 160 MMHG | OXYGEN SATURATION: 96 % | BODY MASS INDEX: 39.01 KG/M2 | WEIGHT: 212 LBS

## 2018-01-01 VITALS
WEIGHT: 183.4 LBS | HEART RATE: 68 BPM | OXYGEN SATURATION: 98 % | HEIGHT: 62 IN | RESPIRATION RATE: 16 BRPM | SYSTOLIC BLOOD PRESSURE: 140 MMHG | BODY MASS INDEX: 33.75 KG/M2 | TEMPERATURE: 98.1 F | DIASTOLIC BLOOD PRESSURE: 84 MMHG

## 2018-01-01 DIAGNOSIS — M06.9 RHEUMATOID ARTHRITIS INVOLVING ANKLE, UNSPECIFIED LATERALITY, UNSPECIFIED RHEUMATOID FACTOR PRESENCE: ICD-10-CM

## 2018-01-01 DIAGNOSIS — N76.0 ACUTE VAGINITIS: ICD-10-CM

## 2018-01-01 DIAGNOSIS — Z79.52 LONG TERM CURRENT USE OF SYSTEMIC STEROIDS: ICD-10-CM

## 2018-01-01 DIAGNOSIS — D50.9 IRON DEFICIENCY ANEMIA, UNSPECIFIED IRON DEFICIENCY ANEMIA TYPE: ICD-10-CM

## 2018-01-01 DIAGNOSIS — Z79.52 LONG TERM (CURRENT) USE OF SYSTEMIC STEROIDS: ICD-10-CM

## 2018-01-01 DIAGNOSIS — D50.9 IRON DEFICIENCY ANEMIA, UNSPECIFIED IRON DEFICIENCY ANEMIA TYPE: Primary | ICD-10-CM

## 2018-01-01 DIAGNOSIS — Z95.2 S/P AORTIC VALVE REPLACEMENT: Primary | ICD-10-CM

## 2018-01-01 DIAGNOSIS — R06.2 INSPIRATORY WHEEZE ON EXAMINATION: ICD-10-CM

## 2018-01-01 DIAGNOSIS — Z79.01 ANTICOAGULATION MONITORING, INR RANGE 2-3: Primary | ICD-10-CM

## 2018-01-01 DIAGNOSIS — Z51.81 ANTICOAGULATION MANAGEMENT ENCOUNTER: ICD-10-CM

## 2018-01-01 DIAGNOSIS — R06.02 SOB (SHORTNESS OF BREATH): Primary | ICD-10-CM

## 2018-01-01 DIAGNOSIS — K08.9 DENTAL DISEASE: ICD-10-CM

## 2018-01-01 DIAGNOSIS — Z12.31 ENCOUNTER FOR SCREENING MAMMOGRAM FOR BREAST CANCER: ICD-10-CM

## 2018-01-01 DIAGNOSIS — I10 ESSENTIAL HYPERTENSION, BENIGN: ICD-10-CM

## 2018-01-01 DIAGNOSIS — Z23 ENCOUNTER FOR IMMUNIZATION: ICD-10-CM

## 2018-01-01 DIAGNOSIS — E66.01 SEVERE OBESITY (BMI 35.0-39.9) WITH COMORBIDITY (HCC): ICD-10-CM

## 2018-01-01 DIAGNOSIS — D50.8 OTHER IRON DEFICIENCY ANEMIA: ICD-10-CM

## 2018-01-01 DIAGNOSIS — Z79.60 LONG-TERM USE OF IMMUNOSUPPRESSANT MEDICATION: ICD-10-CM

## 2018-01-01 DIAGNOSIS — M89.9 DISORDER OF BONE: ICD-10-CM

## 2018-01-01 DIAGNOSIS — Z79.01 ANTICOAGULATION MANAGEMENT ENCOUNTER: ICD-10-CM

## 2018-01-01 DIAGNOSIS — M06.9 RHEUMATOID ARTHRITIS WITH UNKNOWN RHEUMATOID FACTOR STATUS (HCC): Primary | ICD-10-CM

## 2018-01-01 DIAGNOSIS — M17.0 PRIMARY OSTEOARTHRITIS OF BOTH KNEES: ICD-10-CM

## 2018-01-01 LAB
ALBUMIN SERPL-MCNC: 3.7 G/DL (ref 3.5–5.5)
ALBUMIN SERPL-MCNC: 3.7 G/DL (ref 3.5–5.5)
ALBUMIN/GLOB SERPL: 1.1 {RATIO} (ref 1.2–2.2)
ALBUMIN/GLOB SERPL: 1.2 {RATIO} (ref 1.2–2.2)
ALP SERPL-CCNC: 70 IU/L (ref 39–117)
ALP SERPL-CCNC: 73 IU/L (ref 39–117)
ALT SERPL-CCNC: 12 IU/L (ref 0–32)
ALT SERPL-CCNC: 13 IU/L (ref 0–32)
AST SERPL-CCNC: 18 IU/L (ref 0–40)
AST SERPL-CCNC: 20 IU/L (ref 0–40)
BASOPHILS # BLD AUTO: 0 X10E3/UL (ref 0–0.2)
BASOPHILS # BLD AUTO: 0 X10E3/UL (ref 0–0.2)
BASOPHILS NFR BLD AUTO: 0 %
BASOPHILS NFR BLD AUTO: 0 %
BILIRUB SERPL-MCNC: 0.3 MG/DL (ref 0–1.2)
BILIRUB SERPL-MCNC: 0.3 MG/DL (ref 0–1.2)
BUN SERPL-MCNC: 16 MG/DL (ref 6–24)
BUN SERPL-MCNC: 17 MG/DL (ref 6–24)
BUN/CREAT SERPL: 21 (ref 9–23)
BUN/CREAT SERPL: 31 (ref 9–23)
CALCIUM SERPL-MCNC: 9.2 MG/DL (ref 8.7–10.2)
CALCIUM SERPL-MCNC: 9.9 MG/DL (ref 8.7–10.2)
CHLORIDE SERPL-SCNC: 103 MMOL/L (ref 96–106)
CHLORIDE SERPL-SCNC: 103 MMOL/L (ref 96–106)
CO2 SERPL-SCNC: 22 MMOL/L (ref 18–29)
CO2 SERPL-SCNC: 23 MMOL/L (ref 18–29)
CREAT SERPL-MCNC: 0.55 MG/DL (ref 0.57–1)
CREAT SERPL-MCNC: 0.77 MG/DL (ref 0.57–1)
EOSINOPHIL # BLD AUTO: 0.1 X10E3/UL (ref 0–0.4)
EOSINOPHIL # BLD AUTO: 0.1 X10E3/UL (ref 0–0.4)
EOSINOPHIL NFR BLD AUTO: 1 %
EOSINOPHIL NFR BLD AUTO: 1 %
ERYTHROCYTE [DISTWIDTH] IN BLOOD BY AUTOMATED COUNT: 19.9 % (ref 12.3–15.4)
ERYTHROCYTE [DISTWIDTH] IN BLOOD BY AUTOMATED COUNT: 20.4 % (ref 12.3–15.4)
FERRITIN SERPL-MCNC: 19 NG/ML (ref 15–150)
FERRITIN SERPL-MCNC: 35 NG/ML (ref 15–150)
GFR SERPLBLD CREATININE-BSD FMLA CKD-EPI: 103 ML/MIN/1.73
GFR SERPLBLD CREATININE-BSD FMLA CKD-EPI: 108 ML/MIN/1.73
GFR SERPLBLD CREATININE-BSD FMLA CKD-EPI: 125 ML/MIN/1.73
GFR SERPLBLD CREATININE-BSD FMLA CKD-EPI: 89 ML/MIN/1.73
GLOBULIN SER CALC-MCNC: 3 G/DL (ref 1.5–4.5)
GLOBULIN SER CALC-MCNC: 3.5 G/DL (ref 1.5–4.5)
GLUCOSE SERPL-MCNC: 112 MG/DL (ref 65–99)
GLUCOSE SERPL-MCNC: 96 MG/DL (ref 65–99)
HCT VFR BLD AUTO: 32.6 % (ref 34–46.6)
HCT VFR BLD AUTO: 35.4 % (ref 34–46.6)
HGB BLD-MCNC: 10.8 G/DL (ref 11.1–15.9)
HGB BLD-MCNC: 11.6 G/DL (ref 11.1–15.9)
IMM GRANULOCYTES # BLD: 0 X10E3/UL (ref 0–0.1)
IMM GRANULOCYTES # BLD: 0 X10E3/UL (ref 0–0.1)
IMM GRANULOCYTES NFR BLD: 0 %
IMM GRANULOCYTES NFR BLD: 0 %
INR PPP: 2.1 (ref 0.8–1.2)
INR PPP: 3 (ref 0.8–1.2)
IRON SATN MFR SERPL: 34 % (ref 15–55)
IRON SATN MFR SERPL: 8 % (ref 15–55)
IRON SERPL-MCNC: 116 UG/DL (ref 27–159)
IRON SERPL-MCNC: 25 UG/DL (ref 27–159)
LYMPHOCYTES # BLD AUTO: 1.7 X10E3/UL (ref 0.7–3.1)
LYMPHOCYTES # BLD AUTO: 1.9 X10E3/UL (ref 0.7–3.1)
LYMPHOCYTES NFR BLD AUTO: 27 %
LYMPHOCYTES NFR BLD AUTO: 29 %
MCH RBC QN AUTO: 25.1 PG (ref 26.6–33)
MCH RBC QN AUTO: 27.1 PG (ref 26.6–33)
MCHC RBC AUTO-ENTMCNC: 32.8 G/DL (ref 31.5–35.7)
MCHC RBC AUTO-ENTMCNC: 33.1 G/DL (ref 31.5–35.7)
MCV RBC AUTO: 77 FL (ref 79–97)
MCV RBC AUTO: 82 FL (ref 79–97)
MONOCYTES # BLD AUTO: 0.2 X10E3/UL (ref 0.1–0.9)
MONOCYTES # BLD AUTO: 0.5 X10E3/UL (ref 0.1–0.9)
MONOCYTES NFR BLD AUTO: 3 %
MONOCYTES NFR BLD AUTO: 7 %
MORPHOLOGY BLD-IMP: ABNORMAL
MORPHOLOGY BLD-IMP: ABNORMAL
NEUTROPHILS # BLD AUTO: 3.8 X10E3/UL (ref 1.4–7)
NEUTROPHILS # BLD AUTO: 4.5 X10E3/UL (ref 1.4–7)
NEUTROPHILS NFR BLD AUTO: 65 %
NEUTROPHILS NFR BLD AUTO: 67 %
PLATELET # BLD AUTO: 412 X10E3/UL (ref 150–379)
PLATELET # BLD AUTO: 494 X10E3/UL (ref 150–379)
POTASSIUM SERPL-SCNC: 4.2 MMOL/L (ref 3.5–5.2)
POTASSIUM SERPL-SCNC: 4.3 MMOL/L (ref 3.5–5.2)
PROT SERPL-MCNC: 6.7 G/DL (ref 6–8.5)
PROT SERPL-MCNC: 7.2 G/DL (ref 6–8.5)
PROTHROMBIN TIME: 20.8 SEC (ref 9.1–12)
PROTHROMBIN TIME: 29.5 SEC (ref 9.1–12)
RBC # BLD AUTO: 3.99 X10E6/UL (ref 3.77–5.28)
RBC # BLD AUTO: 4.63 X10E6/UL (ref 3.77–5.28)
SODIUM SERPL-SCNC: 140 MMOL/L (ref 134–144)
SODIUM SERPL-SCNC: 141 MMOL/L (ref 134–144)
TIBC SERPL-MCNC: 330 UG/DL (ref 250–450)
TIBC SERPL-MCNC: 343 UG/DL (ref 250–450)
UIBC SERPL-MCNC: 227 UG/DL (ref 131–425)
UIBC SERPL-MCNC: 305 UG/DL (ref 131–425)
WBC # BLD AUTO: 5.7 X10E3/UL (ref 3.4–10.8)
WBC # BLD AUTO: 7 X10E3/UL (ref 3.4–10.8)

## 2018-01-01 PROCEDURE — 36415 COLL VENOUS BLD VENIPUNCTURE: CPT

## 2018-01-01 PROCEDURE — 82728 ASSAY OF FERRITIN: CPT

## 2018-01-01 PROCEDURE — 85610 PROTHROMBIN TIME: CPT

## 2018-01-01 PROCEDURE — 83550 IRON BINDING TEST: CPT

## 2018-01-01 PROCEDURE — 85025 COMPLETE CBC W/AUTO DIFF WBC: CPT

## 2018-01-01 PROCEDURE — 80053 COMPREHEN METABOLIC PANEL: CPT

## 2018-01-01 RX ORDER — DOCUSATE SODIUM 100 MG/1
100 CAPSULE, LIQUID FILLED ORAL 2 TIMES DAILY
COMMUNITY
End: 2018-01-01 | Stop reason: SDUPTHER

## 2018-01-01 RX ORDER — MELATONIN
DAILY
COMMUNITY

## 2018-01-01 RX ORDER — PREDNISONE 1 MG/1
TABLET ORAL
Qty: 120 TAB | Refills: 0 | Status: SHIPPED | OUTPATIENT
Start: 2018-01-01 | End: 2018-01-01 | Stop reason: SDUPTHER

## 2018-01-01 RX ORDER — ASPIRIN 81 MG/1
81 TABLET ORAL DAILY
COMMUNITY

## 2018-01-01 RX ORDER — PREDNISONE 5 MG/1
5 TABLET ORAL DAILY
Qty: 90 TAB | Refills: 0 | Status: SHIPPED | OUTPATIENT
Start: 2018-01-01

## 2018-01-01 RX ORDER — PANTOPRAZOLE SODIUM 40 MG/1
40 TABLET, DELAYED RELEASE ORAL DAILY
COMMUNITY
End: 2018-01-01 | Stop reason: SDUPTHER

## 2018-01-01 RX ORDER — PREDNISONE 1 MG/1
TABLET ORAL
Qty: 120 TAB | Refills: 0 | Status: SHIPPED | OUTPATIENT
Start: 2018-01-01 | End: 2018-01-01

## 2018-01-01 RX ORDER — SODIUM CHLORIDE 9 MG/ML
25 INJECTION, SOLUTION INTRAVENOUS CONTINUOUS
Status: DISPENSED | OUTPATIENT
Start: 2018-01-01 | End: 2018-01-01

## 2018-01-01 RX ORDER — FUROSEMIDE 40 MG/1
40 TABLET ORAL DAILY
COMMUNITY
End: 2018-01-01 | Stop reason: SDUPTHER

## 2018-01-01 RX ORDER — SODIUM CHLORIDE 0.9 % (FLUSH) 0.9 %
5-10 SYRINGE (ML) INJECTION AS NEEDED
Status: CANCELLED | OUTPATIENT
Start: 2018-01-01 | End: 2018-01-01

## 2018-01-01 RX ORDER — FLUCONAZOLE 150 MG/1
TABLET ORAL
Qty: 1 TAB | Refills: 3 | Status: SHIPPED | OUTPATIENT
Start: 2018-01-01

## 2018-01-01 RX ORDER — PREDNISONE 1 MG/1
TABLET ORAL
Qty: 90 TAB | Refills: 0 | Status: SHIPPED | OUTPATIENT
Start: 2018-01-01 | End: 2018-01-01 | Stop reason: SDUPTHER

## 2018-01-01 RX ORDER — FOLIC ACID 1 MG/1
1 TABLET ORAL DAILY
Qty: 90 TAB | Refills: 3 | Status: SHIPPED | OUTPATIENT
Start: 2018-01-01 | End: 2018-01-01 | Stop reason: SDUPTHER

## 2018-01-01 RX ORDER — ACETAMINOPHEN 325 MG/1
325 TABLET ORAL
COMMUNITY

## 2018-01-01 RX ORDER — CEPHALEXIN 500 MG/1
CAPSULE ORAL
Qty: 4 CAP | Refills: 0 | Status: SHIPPED | OUTPATIENT
Start: 2018-01-01 | End: 2018-01-01

## 2018-01-01 RX ORDER — WARFARIN 2.5 MG/1
2.5 TABLET ORAL DAILY
COMMUNITY
End: 2018-01-01 | Stop reason: SDUPTHER

## 2018-01-01 RX ORDER — METOPROLOL TARTRATE 50 MG/1
TABLET ORAL 2 TIMES DAILY
COMMUNITY
End: 2018-01-01 | Stop reason: SDUPTHER

## 2018-01-01 RX ORDER — DOXYCYCLINE 100 MG/1
CAPSULE ORAL
Refills: 0 | COMMUNITY
Start: 2018-01-01 | End: 2018-01-01 | Stop reason: ALTCHOICE

## 2018-01-01 RX ORDER — FOLIC ACID 1 MG/1
1 TABLET ORAL DAILY
Qty: 90 TAB | Refills: 3 | Status: CANCELLED | OUTPATIENT
Start: 2018-01-01

## 2018-01-01 RX ORDER — METHOTREXATE 2.5 MG/1
20 TABLET ORAL
Qty: 96 TAB | Refills: 0 | Status: SHIPPED | OUTPATIENT
Start: 2018-07-08

## 2018-01-01 RX ORDER — FLUCONAZOLE 150 MG/1
TABLET ORAL
Qty: 1 TAB | Refills: 3 | Status: CANCELLED | OUTPATIENT
Start: 2018-01-01

## 2018-01-01 RX ORDER — FLUCONAZOLE 150 MG/1
TABLET ORAL
Qty: 1 TAB | Refills: 3 | Status: SHIPPED | OUTPATIENT
Start: 2018-01-01 | End: 2018-01-01 | Stop reason: SDUPTHER

## 2018-01-01 RX ORDER — POTASSIUM CHLORIDE 20 MEQ/1
20 TABLET, EXTENDED RELEASE ORAL DAILY
COMMUNITY
End: 2018-01-01 | Stop reason: SDUPTHER

## 2018-01-01 RX ORDER — FOLIC ACID 1 MG/1
1 TABLET ORAL DAILY
Qty: 90 TAB | Refills: 0 | Status: SHIPPED | OUTPATIENT
Start: 2018-01-01

## 2018-01-23 NOTE — TELEPHONE ENCOUNTER
Patient calling for medication refill   Requested Prescriptions     Pending Prescriptions Disp Refills    predniSONE (DELTASONE) 1 mg tablet 120 Tab 1     Sig: Take 5 mg daily. Lower by 1 mg every 1-2 weeks until maintenance of 1 mg daily.    Best contact number is

## 2018-02-23 NOTE — TELEPHONE ENCOUNTER
Refill [phone]     Requested Prescriptions     Pending Prescriptions Disp Refills    fluconazole (DIFLUCAN) 150 mg tablet 1 Tab 3     Sig: take 1 tablet by mouth as a single dose

## 2018-03-15 PROBLEM — M89.9 DISORDER OF BONE AND CARTILAGE, UNSPECIFIED: Status: RESOLVED | Noted: 2017-01-01 | Resolved: 2018-01-01

## 2018-03-15 PROBLEM — M94.9 DISORDER OF BONE AND CARTILAGE, UNSPECIFIED: Status: RESOLVED | Noted: 2017-01-01 | Resolved: 2018-01-01

## 2018-03-15 NOTE — PROGRESS NOTES
HISTORY OF PRESENT ILLNESS  Maribel Scott is a 46 y.o. female. HPI  C/o shortness of breath over the past 3 months. Reports it is mainly with exertion but while she was sleeping last night, it awoke her from sleep. Denies chest pain, irregular heart rate. History of aortic valve insufficiency. Has not seen cardiologist in years. Denies asthma or AR but states she has an albuterol inhaler that she uses periodically when the weather is cold. History of HUNTER, s/p iron transfusions, last infusion was July of 2016, followed by hematology. She has not seen hematologist since her last transfusion. Reports she is intolerant to iron supplements. Past Medical History:   Diagnosis Date    Aortic valve insufficiency 04/25/2016    heart murmer    Bilateral ovarian cysts 6/24/2015    Carpal tunnel syndrome     Cataract     Coagulation defects     recently anemic    Fe deficiency anemia     Gastrointestinal disorder     GERD    GERD (gastroesophageal reflux disease)     Hypertension     Iritis     Migraine without status migrainosus, not intractable 6/6/2016    Osteopenia     Pulmonary embolism (HCC)     RA [facility]oid arthritis) ([facility]     Thromboembolus (HCC) 4-5-12    right calf    Thyroid disease     nodules on thyroid     Patient Active Problem List   Diagnosis Code    GERD (gastroesophageal reflux disease) K21.9    Essential hypertension, benign I10    RA (rheumatoid arthritis) ([facility] M06.9    Long-term use of immunosuppressant medication Z79.899    Long term current use of systemic steroids Z79.52    Iron deficiency anemia D50.9    Depression F32.9    Dependent edema R60.9     Current Outpatient Prescriptions   Medication Sig    folic acid (FOLVITE) 1 mg tablet Take 1 Tab by mouth daily.  predniSONE (DELTASONE) 1 mg tablet TAKE 5 MG(5 TABLETS) BY MOUTH DAILY.  LOWER BY 1 MG( 1 TABLET) EVERY 1 TO 2 WEEKS UNTIL MAINTENANXCE OF 1MG DAILY    fluconazole (DIFLUCAN) 150 mg tablet take 1 tablet by mouth as a single dose    XELJANZ 5 mg tab Take 1 tablet by mouth twice a day as directed by physician.  hydroCHLOROthiazide (HYDRODIURIL) 25 mg tablet Take 1 Tab by mouth daily.  topiramate (TOPAMAX) 50 mg tablet Take 1 Tab by mouth two (2) times a day.  sulfaSALAzine EC (AZULFIDINE EN-TABS) 500 mg EC tablet Take 2 Tabs by mouth two (2) times a day. (Patient taking differently: Take 1,000 mg by mouth two (2) times a day. Takes 1 tab daily.)    methotrexate (RHEUMATREX) 2.5 mg tablet Take 8 Tabs by mouth every Sunday.  diclofenac EC (VOLTAREN) 75 mg EC tablet Take 1 Tab by mouth two (2) times a day.  albuterol (PROVENTIL HFA, VENTOLIN HFA, PROAIR HFA) 90 mcg/actuation inhaler Take 2 Puffs by inhalation every six (6) hours as needed for Wheezing.  amLODIPine (NORVASC) 5 mg tablet Take 1 Tab by mouth daily.  methocarbamol (ROBAXIN) 500 mg tablet Take 1 Tab by mouth nightly as needed.  oxybutynin (DITROPAN) 5 mg tablet Take 1 Tab by mouth two (2) times a day.  FLUoxetine (PROZAC) 40 mg capsule Take 1 Cap by mouth daily.  ALPRAZolam (XANAX) 0.5 mg tablet 1 tab po bid as needed for severe anxiety and/or sleep    ondansetron (ZOFRAN ODT) 4 mg disintegrating tablet Take 1 Tab by mouth every eight (8) hours as needed for Nausea.  SUMAtriptan (IMITREX) 50 mg tablet Take 1 Tab by mouth once as needed for Migraine for 1 dose.  esomeprazole (NEXIUM) 40 mg capsule Take 1 Cap by mouth daily. No current facility-administered medications for this visit.       Social History   Substance Use Topics    Smoking status: Never Smoker    Smokeless tobacco: Never Used    Alcohol use No     Visit Vitals    /66 (BP 1 Location: Left arm, BP Patient Position: Sitting)    Pulse 86    Temp 97.9 °F (36.6 °C) (Oral)    Resp 16    Ht 5' 2\" (1.575 m)    Wt 212 lb (96.2 kg)    SpO2 96%    BMI 38.78 kg/m2     Review of Systems   Constitutional: Negative for chills, fever and malaise/fatigue. HENT: Negative for congestion. Respiratory: Positive for shortness of breath. Negative for cough and wheezing. Cardiovascular: Negative for chest pain, palpitations, orthopnea and leg swelling. Neurological: Negative for dizziness and weakness. All other systems reviewed and are negative. Physical Exam   Constitutional: No distress. Neck: Neck supple. Cardiovascular: Normal rate. Murmur heard. Pulmonary/Chest: Effort normal and breath sounds normal. She has no wheezes. Abdominal: Soft. She exhibits no distension. There is no tenderness. There is no guarding. Musculoskeletal: She exhibits no edema. Lymphadenopathy:     She has no cervical adenopathy. Neurological: She is alert. Skin: Skin is warm and dry. Psychiatric: She has a normal mood and affect. ASSESSMENT and PLAN  Diagnoses and all orders for this visit:    1. SOB (shortness of breath)  -     CBC WITH AUTOMATED DIFF  -     METABOLIC PANEL, COMPREHENSIVE  -     CO HANDLG&/OR CONVEY OF SPEC FOR TR OFFICE TO LAB  -     COLLECTION VENOUS BLOOD,VENIPUNCTURE  -     XR CHEST PA LAT; Future  Chest xray with no acute abnormality. May be due to anemia. Check labs today. Consider echocardiogram if labs normal.    2. Encounter for immunization  -     Influenza virus vaccine (QUADRIVALENT PRES FREE SYRINGE) IM (22563)    3. Iron deficiency anemia, unspecified iron deficiency anemia type  -     CBC WITH AUTOMATED DIFF  -     FERRITIN  -     IRON PROFILE  -     folic acid (FOLVITE) 1 mg tablet; Take 1 Tab by mouth daily. 4. BMI 38.0-38.9,adult  Weight loss recommendations given. 5. Encounter for screening mammogram for breast cancer  -     [facility] MAMMO BI SCREENING INCL CAD; Future    Follow up pending test results. To ED if sx worsen.

## 2018-03-15 NOTE — MR AVS SNAPSHOT
Jes Pulse 
 
 
 [address] [facility] 
[phone] Patient: Estrella Addison MRN: [id number] :[id number] Visit Information Date & Time Provider Department Dept. Phone Encounter #  
 3/15/2018 10:30 AM Callum Brothers  [facility] [phone] [id number] Your Appointments 2018 10:40 AM  
ACUTE CARE with Lynda Santiago MD  
[address] ([address]) Appt Note: DR. Shiva Chin PT; rescheduled from 18  
 [facility] [phone] [facility] [address]  
  
    
 5/3/2018  1:00 PM  
Complete Physical with Jaya Medina MD  
[address] [facility] [address]) Appt Note: CPE 0cp/0pb arh 3/13/18  
 [address] [phone]  
[phone]  
  
   
 [address] [address] Upcoming Health Maintenance Date Due  
 BREAST CANCER SCRN MAMMOGRAM 2016 PAP AKA CERVICAL CYTOLOGY 2018 DTaP/Tdap/Td series (2 - Td) 2021 COLONOSCOPY 2026 Allergies as of 3/15/2018  Review Complete On: 3/15/2018 By: Callum Brothers NP Severity Noted Reaction Type Reactions Percocet [Oxycodone-acetaminophen] High 2010   Systemic Hives Reglan [Metoclopramide] High 2015   Systemic Anaphylaxis, Anxiety Ace Inhibitors  2016    Cough Tramadol  2013    Itching Current Immunizations  Reviewed on 2017 Name Date Hepatitis B Vaccine 1998, 1998, 3/24/1998 Influenza High Dose Vaccine PF 10/28/2014 Influenza Vaccine (Quad)  Incomplete Influenza Vaccine (Quad) PF 2016 Influenza Vaccine PF 2013 Influenza Vaccine Split 2012, 2011 PPD 2012,  Incomplete, 2011, 2011 TDAP Vaccine 2011 ZZZ-RETIRED (DO NOT USE) Pneumococcal Vaccine (Unspecified Type) 11/19/2012 Not reviewed this visit You Were Diagnosed With   
  
 Codes Comments SOB (shortness of breath)    -  Primary ICD-10-CM: R06.02 
ICD-9-CM: 786.05 Encounter for immunization     ICD-10-CM: G10 ICD-9-CM: V03.89 Iron deficiency anemia, unspecified iron deficiency anemia type     ICD-10-CM: D50.9 ICD-9-CM: 280.9 Vitals BP Pulse Temp Resp Height(growth percentile) Weight(growth percentile) 160/66 (BP 1 Location: Left arm, BP Patient Position: Sitting) 86 97.9 °F (36.6 °C) (Oral) 16 5' 2\" (1.575 m) 212 lb (96.2 kg) LMP SpO2 BMI OB Status Smoking Status 04/29/2016 96% 38.78 kg/m2 Postmenopausal Never Smoker Vitals History BMI and BSA Data Body Mass Index Body Surface Area 38.78 kg/m 2 2.05 m 2 Preferred Pharmacy Pharmacy Name Phone [facility], [address] [phone] Your Updated Medication List  
  
   
This list is accurate as of 3/15/18 11:32 AM.  Always use your most recent med list.  
  
  
  
  
 albuterol 90 mcg/actuation inhaler Commonly known as:  PROVENTIL HFA, VENTOLIN HFA, PROAIR HFA Take 2 Puffs by inhalation every six (6) hours as needed for Wheezing. ALPRAZolam 0.5 mg tablet Commonly known as:  XANAX  
1 tab po bid as needed for severe anxiety and/or sleep  
  
 amLODIPine 5 mg tablet Commonly known as:  Nichole Priest Take 1 Tab by mouth daily. diclofenac EC 75 mg EC tablet Commonly known as:  VOLTAREN Take 1 Tab by mouth two (2) times a day. esomeprazole 40 mg capsule Commonly known as:  NexIUM Take 1 Cap by mouth daily. fluconazole 150 mg tablet Commonly known as:  DIFLUCAN  
take 1 tablet by mouth as a single dose FLUoxetine 40 mg capsule Commonly known as:  PROzac Take 1 Cap by mouth daily. folic acid 1 mg tablet Commonly known as:  Google Take 1 Tab by mouth daily. hydroCHLOROthiazide 25 mg tablet Commonly known as:  HYDRODIURIL Take 1 Tab by mouth daily. methocarbamol 500 mg tablet Commonly known as:  ROBAXIN Take 1 Tab by mouth nightly as needed. methotrexate 2.5 mg tablet Commonly known as:  Debbrah Muff Take 8 Tabs by mouth every Sunday. ondansetron 4 mg disintegrating tablet Commonly known as:  ZOFRAN ODT Take 1 Tab by mouth every eight (8) hours as needed for Nausea. oxybutynin 5 mg tablet Commonly known as:  PZFPDCQS Take 1 Tab by mouth two (2) times a day. predniSONE 1 mg tablet Commonly known as:  DELTASONE  
TAKE 5 MG(5 TABLETS) BY MOUTH DAILY. LOWER BY 1 MG( 1 TABLET) EVERY 1 TO 2 WEEKS UNTIL MAINTENANXCE OF 1MG DAILY  
  
 sulfaSALAzine  mg EC tablet Commonly known as:  AZULFIDINE EN-TABS Take 2 Tabs by mouth two (2) times a day. SUMAtriptan 50 mg tablet Commonly known as:  IMITREX Take 1 Tab by mouth once as needed for Migraine for 1 dose. topiramate 50 mg tablet Commonly known as:  TOPAMAX Take 1 Tab by mouth two (2) times a day. XELJANZ 5 mg tab Generic drug:  tofacitinib Take 1 tablet by mouth twice a day as directed by physician. We Performed the Following CBC WITH AUTOMATED DIFF [40169 CPT(R)] COLLECTION VENOUS BLOOD,VENIPUNCTURE [id number] CPT(R)] FERRITIN [97252 CPT(R)] INFLUENZA VACCINE QUADRIVALENT VIAL, SPLIT, 3 YRS PLUS IM [id number] CPT(R)] IRON PROFILE [id number] CPT(R)] METABOLIC PANEL, COMPREHENSIVE [01955 CPT(R)] MS HANDLG&/OR CONVEY OF SPEC FOR TR OFFICE TO LAB [06846 CPT(R)] To-Do List   
 03/15/2018 Imaging:  XR CHEST PA LAT Introducing [facility]! [employer] [place] [employer] introduces BeGo patient portal. Now you can access parts of your medical record, email your doctor's office, and request medication refills online. 1. In your internet browser, go to [email or web address] 2. Click on the First Time User? Click Here link in the Sign In box. You will see the New Member Sign Up page. 3. Enter your TableApp Access Code exactly as it appears below. You will not need to use this code after youve completed the sign-up process. If you do not sign up before the expiration date, you must request a new code. · TableApp Access Code: [email or web address] Expires: 6/13/2018 10:23 AM 
 
4. Enter the last four digits of your Social Security Number (xxxx) and Date of Birth (mm/dd/yyyy) as indicated and click Submit. You will be taken to the next sign-up page. 5. Create a PTS Physicianst ID. This will be your TableApp login ID and cannot be changed, so think of one that is secure and easy to remember. 6. Create a TableApp password. You can change your password at any time. 7. Enter your Password Reset Question and Answer. This can be used at a later time if you forget your password. 8. Enter your e-mail address. You will receive e-mail notification when new information is available in [address] 9. Click Sign Up. You can now view and download portions of your medical record. 10. Click the Download Summary menu link to download a portable copy of your medical information. If you have questions, please visit the Frequently Asked Questions section of the TableApp website. Remember, TableApp is NOT to be used for urgent needs. For medical emergencies, dial 911. Now available from your iPhone and Android! Please provide this summary of care documentation to your next provider. Your primary care clinician is listed as Rachel Adan. If you have any questions after today's visit, please call [phone].

## 2018-03-15 NOTE — PROGRESS NOTES
Pt presents to office today for a SOB and cough that she has been experiencing for 3 months off and on. Pt reports that she felt pain in her right rib cage yesterday and has been coughing yellowish thick phlegm for a while now too. Chief Complaint   Patient presents with    Breathing Problem     For 3 months off and on.  Cough     For a while now. 1. Have you been to the ER, urgent care clinic since your last visit? Hospitalized since your last visit? No    2. Have you seen or consulted any other health care providers outside of the [address] [place] [address] since your last visit? Include any pap smears or colon screening.  Yes to [facility] for pneumonia 12/2017

## 2018-03-18 NOTE — PROGRESS NOTES
Hemoglobin and hematocrit is decreasing and iron levels are low.   Recommend follow up with hematologist.

## 2018-04-03 PROBLEM — M06.9 RHEUMATOID ARTHRITIS INVOLVING ANKLE (HCC): Status: ACTIVE | Noted: 2018-01-01

## 2018-04-03 PROBLEM — E66.01 SEVERE OBESITY (BMI 35.0-39.9) WITH COMORBIDITY (HCC): Status: ACTIVE | Noted: 2018-01-01

## 2018-04-03 NOTE — PROGRESS NOTES
Hematology/Oncology Progress Note    REASON FOR VISIT: Anemia    HISTORY OF PRESENT ILLNESS: Ms. Shari Alex is a 46 y.o. female who presents for follow-up of anemia. Tells me she has had anemia for years. She has had colonoscopy in the past.  Never had an EGD. When Dr. Edy Caro first saw her she had very significant pica for ice along with parameters consistent with iron deficiency anemia. This resolved with IV feraheme in 2015. Anemia recurred and she was treated with Injectafer on 16 and 16. Presents for follow-up. She has noted significant fatigue, some GILMAN, no melena, hematochezia, hemoptysis, hematuria, hematemesis. Craving for ice. She has been post menopausal for 5-6 years. She has had no CP, dizziness, falls, rashes, HA. As before, she had an ablation done for fibroids and heavy bleeding in . Also with a history of DVT and PE. She has not had an EGD and colonoscopy    No fevers, chills, night sweats. No weight loss.      Past Medical History:   Diagnosis Date    Aortic valve insufficiency 2016    heart murmer    Bilateral ovarian cysts 2015    Carpal tunnel syndrome     Cataract     Coagulation defects     recently anemic    Fe deficiency anemia     Gastrointestinal disorder     GERD    GERD (gastroesophageal reflux disease)     Hypertension     Iritis     Migraine without status migrainosus, not intractable 2016    Osteopenia     Pulmonary embolism (HCC)     RA (rheumatoid arthritis) ([address]     Thromboembolus ([address] 4-5-12    right calf    Thyroid disease     nodules on thyroid       Past Surgical History:   Procedure Laterality Date    ENDOSCOPY, COLON, DIAGNOSTIC      normal    HX GYN      tubal ligation    HX GYN  ,      x2    HX ORTHOPAEDIC      R foot surgery       Allergies   Allergen Reactions    Percocet [Oxycodone-Acetaminophen] Hives    Reglan [Metoclopramide] Anaphylaxis and Anxiety    Ace Inhibitors Cough    Tramadol Itching       Current Outpatient Prescriptions   Medication Sig Dispense Refill    predniSONE (DELTASONE) 1 mg tablet TAKE 5 MG(5 TABLETS) BY MOUTH DAILY. LOWER BY 1 MG( 1 TABLET) EVERY 1 TO 2 WEEKS UNTIL MAINTENANXCE OF 1MG DAILY 120 Tab 0    doxycycline (VIBRAMYCIN) 100 mg capsule TAKE ONE CAPSULE BY MOUTH TWICE A DAY  0    folic acid (FOLVITE) 1 mg tablet Take 1 Tab by mouth daily. 90 Tab 3    fluconazole (DIFLUCAN) 150 mg tablet take 1 tablet by mouth as a single dose 1 Tab 3    XELJANZ 5 mg tab Take 1 tablet by mouth twice a day as directed by physician. 60 Tab 5    hydroCHLOROthiazide (HYDRODIURIL) 25 mg tablet Take 1 Tab by mouth daily. 30 Tab 11    topiramate (TOPAMAX) 50 mg tablet Take 1 Tab by mouth two (2) times a day. 60 Tab 11    sulfaSALAzine EC (AZULFIDINE EN-TABS) 500 mg EC tablet Take 2 Tabs by mouth two (2) times a day. (Patient taking differently: Take 1,000 mg by mouth two (2) times a day. Takes 1 tab daily.) 120 Tab 5    methotrexate (RHEUMATREX) 2.5 mg tablet Take 8 Tabs by mouth every Sunday. 36 Tab 3    diclofenac EC (VOLTAREN) 75 mg EC tablet Take 1 Tab by mouth two (2) times a day. 30 Tab 5    albuterol (PROVENTIL HFA, VENTOLIN HFA, PROAIR HFA) 90 mcg/actuation inhaler Take 2 Puffs by inhalation every six (6) hours as needed for Wheezing. 1 Inhaler 0    amLODIPine (NORVASC) 5 mg tablet Take 1 Tab by mouth daily. 30 Tab 5    methocarbamol (ROBAXIN) 500 mg tablet Take 1 Tab by mouth nightly as needed. 30 Tab 1    oxybutynin (DITROPAN) 5 mg tablet Take 1 Tab by mouth two (2) times a day. 60 Tab 5    FLUoxetine (PROZAC) 40 mg capsule Take 1 Cap by mouth daily. 30 Cap 5    ALPRAZolam (XANAX) 0.5 mg tablet 1 tab po bid as needed for severe anxiety and/or sleep 60 Tab 0    ondansetron (ZOFRAN ODT) 4 mg disintegrating tablet Take 1 Tab by mouth every eight (8) hours as needed for Nausea.  10 Tab 0    SUMAtriptan (IMITREX) 50 mg tablet Take 1 Tab by mouth once as needed for Migraine for 1 dose. 15 Tab 3    esomeprazole (NEXIUM) 40 mg capsule Take 1 Cap by mouth daily. 27 Cap 3       Social History     Social History    Marital status:      Spouse name: N/A    Number of children: 2    Years of education: N/A     Occupational History    skilled nursing work, [address] [employer]     Not Employed     Social History Main Topics    Smoking status: Never Smoker    Smokeless tobacco: Never Used    Alcohol use No    Drug use: No    Sexual activity: Yes     Partners: Male     Birth control/ protection: Surgical     Other Topics Concern    None     Social History Narrative       Family History   Problem Relation Age of Onset   [facility] Arthritis-rheumatoid Father     Cancer Father      lung    Hypertension Father     Lung Disease Father      lung cancer    Migraines Father      cluster migraines    Arthritis-rheumatoid Sister     Headache Sister     Arthritis-osteo Sister     Diabetes Maternal Aunt     Cancer Maternal Grandmother      cervical       ROS  As per the HPI, otherwise a comprehensive ROS is negative. Physical Examination:   Visit Vitals    /82    Pulse 88    Temp 98.2 °F (36.8 °C)    Resp 20    Ht 5' 2\" (1.575 m)    Wt 208 lb (94.3 kg)    LMP 04/29/2016    SpO2 94%    BMI 38.04 kg/m2     General appearance - alert, well appearing, and in no distress  Mental status - oriented to person, place, and time  HENT: Pallor present.  No icterus  Mouth - mucous membranes moist, pharynx normal without lesions  Neck - supple, no significant adenopathy  Lymphatics - no palpable lymphadenopathy, no hepatosplenomegaly  Chest - clear to auscultation, no wheezes, rales or rhonchi, symmetric air entry  Heart - normal rate, regular rhythm, normal S1, S2, systolic murmur  Abdomen - soft, nontender, nondistended, no masses or organomegaly, bowel sounds present  Back exam - full range of motion, no tenderness, palpable spasm or pain on motion  Neurological - normal speech, no focal findings or movement disorder noted  Musculoskeletal - no joint tenderness, deformity or swelling  Extremities - peripheral pulses normal, no pedal edema, no clubbing or cyanosis  Skin - normal coloration and turgor, no rashes, no suspicious skin lesions noted    LABS  Lab Results   Component Value Date/Time    WBC 7.0 03/15/2018 11:49 AM    HGB 10.8 (L) 03/15/2018 11:49 AM    HCT 32.6 (L) 03/15/2018 11:49 AM    PLATELET 685 (H) 43/39/2360 11:49 AM    MCV 82 03/15/2018 11:49 AM    ABS. NEUTROPHILS 4.5 03/15/2018 11:49 AM     Lab Results   Component Value Date/Time    Sodium 140 03/15/2018 11:49 AM    Potassium 4.2 03/15/2018 11:49 AM    Chloride 103 03/15/2018 11:49 AM    CO2 23 03/15/2018 11:49 AM    Glucose 96 03/15/2018 11:49 AM    BUN 17 03/15/2018 11:49 AM    Creatinine 0.55 (L) 03/15/2018 11:49 AM    GFR est  03/15/2018 11:49 AM    GFR est non- 03/15/2018 11:49 AM    Calcium 9.2 03/15/2018 11:49 AM     Lab Results   Component Value Date/Time    AST (SGOT) 18 03/15/2018 11:49 AM    Alk. phosphatase 73 03/15/2018 11:49 AM    Protein, total 6.7 03/15/2018 11:49 AM    Albumin 3.7 03/15/2018 11:49 AM    Globulin 3.8 06/24/2015 07:45 AM    A-G Ratio 1.2 03/15/2018 11:49 AM     Lab Results   Component Value Date/Time    Iron % saturation 8 (LL) 03/15/2018 11:49 AM    TIBC 330 03/15/2018 11:49 AM    Ferritin 19 03/15/2018 11:49 AM    Sed rate (ESR) 13 11/06/2017 11:05 AM    C-Reactive protein 0.35 05/21/2010 03:51 PM    C-Reactive Protein, Qt 27.3 (H) 11/06/2017 11:05 AM    TSH 1.320 11/23/2011 10:19 AM    INR 1.0 03/14/2013 10:46 AM    aPTT 41.5 (H) 04/16/2012 04:10 PM    MAURICIO, Direct None Detected 05/21/2010 03:51 PM    Lipase 151 06/24/2015 08:15 AM       ASSESSMENT  Ms. Sudeep Lazar is a 46 y.o. female with RA who presents for follow-up of iron deficiency anemia. DISCUSSION/PLAN  1. Iron deficiency anemia.      No obvious bleeding  Is post menopausal  Needs a thorough GI evaluation with EGD and Colonoscopy- refer to Dr. Cordelia Maxwell    I will arrange for Injectafer x 2 and reassess labs at which time will r/o other etiologies for anemia    2. Hematuria. In the past.  None recently    3. Thrombocytosis    Reactive to anemia secondary to iron deficiency    4. RA  Establishing care with Dr. Carlyn Bowles see her back in 9-10 weeks with labs    Nick Guillaume MD    Ms. Vadim Alcala has a reminder for a \"due or due soon\" health maintenance. I have asked that she contact her primary care provider for follow-up on this health maintenance.

## 2018-04-04 NOTE — TELEPHONE ENCOUNTER
Call to patient, HIPAA verified. Advised of scheduled referral appointment with Dr. Marin Smith on 4/24 at 11 am. Patient will need to have $100 at time of service. Office location and contact information provided to patient. Patient verbalized understanding and thanked for call. Office note faxed to Dr. Dickens November office as requested.

## 2018-05-04 NOTE — TELEPHONE ENCOUNTER
Tomasa Bookbinder  From [facility] calling in ref to letting us know PT has had aortic mayank replaced and they want to know if we are willing to follow pt's INR checks .   Best # [phone]

## 2018-05-07 NOTE — TELEPHONE ENCOUNTER
Nellie CORNEJO is calling on patients behalf she states patient will be discharged from [facility] on tomorrow 5/7/18. She was following up with our office in regards to making sure we follow up with INR check. She states that patient will be discharged into home health along with being followed for the next few days as well and the results from patients INR will be faxed to our office. She was provided with office fax number. She states that patient is not currently taking methotrexate and is on a baby dose of prednisone. She has also scheduled for patient to be seen in office on Thursday, May 24, 2018 01:45 PM for her CHILO.      Best call [phone]

## 2018-05-09 NOTE — TELEPHONE ENCOUNTER
Christianne Juarez from [facility] is calling on behalf of patient she states that patient does not have insurance and wants to make sure that Dr. Maile Rosen will be signing orders for patient to receive Home health and pt only for limited visits.     Best call back [phone]/

## 2018-05-09 NOTE — TELEPHONE ENCOUNTER
Jenna from Home health is calling in regards to the paper work she needs to be signed by Dr Paula Bowman, for two visits for home health services.   Patient PCP is Kena, and the nurse Valeria David is expecting a call from the nurse or  to confirm that order would be signed  Shad Arriaga best call back :[phone]

## 2018-05-09 NOTE — TELEPHONE ENCOUNTER
Called and spoke with Jenna. She states that [facility] is paying for 2 skilled nursing visits and 3 physical therapy visits. Jenna needs an okay to continue monitoring patient and for home safety. Advised that will be okay. She states that she will fax over the order.

## 2018-05-11 NOTE — TELEPHONE ENCOUNTER
----- Message from Eliecer Dorado sent at 5/11/2018  7:05 AM EDT -----  Regarding: NP [place]/Telephone  The patient is requesting a call back to reschedule her hospital follow up appointment.  [phone])[phone]

## 2018-05-23 NOTE — TELEPHONE ENCOUNTER
Requested Prescriptions     Pending Prescriptions Disp Refills    fluconazole (DIFLUCAN) 150 mg tablet 1 Tab 3     Sig: take 1 tablet by mouth as a single dose     Pharmacy on file verified

## 2018-05-23 NOTE — TELEPHONE ENCOUNTER
Called and spoke with patient. Advised to wait until her appointment tomorrow for a refill of diflucan. Patient verbalized understanding.

## 2018-05-31 PROBLEM — M06.9 RHEUMATOID ARTHRITIS INVOLVING ANKLE (HCC): Status: RESOLVED | Noted: 2018-01-01 | Resolved: 2018-01-01

## 2018-05-31 NOTE — PROGRESS NOTES
Pt presents to office today for a follow up on Heart Surgery that she had a MCV by Dr. Ruben Forde. She reports that she is feeling great. Chief Complaint   Patient presents with    Heart Problem     Follow up after heart surgery 5/3/2018 at [facility].(Dr. Alfreda Dueñas)     1. Have you been to the ER, urgent care clinic since your last visit? Hospitalized since your last visit? No    2. Have you seen or consulted any other health care providers outside of the [address] [place] [address] since your last visit? Include any pap smears or colon screening.  Yes to her cardiologist

## 2018-05-31 NOTE — PROGRESS NOTES
HISTORY OF PRESENT ILLNESS  Bee Segal is a 46 y.o. female. HPI  Follow up aortic valve replacement surgery. Patient reports she had surgery done on 5/3/18 at [address]. Records reviewed and summarized as follows:  Hospitalized on 4/28/18 with SOB. History of aortic regurg followed by Dr. Graciela Valentin.  Recently diagnosed with HF EF of 45%. Surgery performed by Dr. Kp Brooks without complication. Began anticoagulation with coumadin, currently taking 2.5 mg daily. Patient has been receiving home health OT/PT. Visits ended. Reports she has no scheduled follow up with surgeon or cardiologist at this time. Reports no follow up PT/INR has been done since discharge. Denies bleeding. Requesting refill on diflucan. Currently on low dose steroids for RA. Followed by rheumatologist Dr. Lala Denson at [facility]. Methotrexate resumed. Over all she is feeling well. Requesting antibiotic prophylaxis for upcoming dental procedure. Denies chest pain, SOB or chest tightness. No edema. History of HUNTER. Iron levels were very low prior to surgery. Currently not on any iron supplements due to decreased tolerance. Has had transfusions in the past.  Currently  asymptomatic.   Past Medical History:   Diagnosis Date    Aortic valve insufficiency 04/25/2016    heart murmer    Bilateral ovarian cysts 6/24/2015    Carpal tunnel syndrome     Cataract     Coagulation defects     recently anemic    Fe deficiency anemia     Gastrointestinal disorder     GERD    GERD (gastroesophageal reflux disease)     Hypertension     Iritis     Migraine without status migrainosus, not intractable 6/6/2016    Osteopenia     Pulmonary embolism (HCC)     RA (rheumatoid arthritis) (HCC)     Thromboembolus (HCC) 4-5-12    right calf    Thyroid disease     nodules on thyroid     Patient Active Problem List   Diagnosis Code    GERD (gastroesophageal reflux disease) K21.9    Essential hypertension, benign I10    RA (rheumatoid arthritis) ([facility] M06.9    Long-term use of immunosuppressant medication Z79.899    Long term current use of systemic steroids Z79.52    Iron deficiency anemia D50.9    Depression F32.9    Dependent edema R60.9    Severe obesity (BMI 35.0-39. 9) with comorbidity (HCC) E66.01     Current Outpatient Prescriptions   Medication Sig    acetaminophen (TYLENOL) 325 mg tablet Take 325 mg by mouth every six (6) hours as needed for Pain.  aspirin delayed-release 81 mg tablet Take 81 mg by mouth daily.  cholecalciferol (VITAMIN D3) 1,000 unit tablet Take  by mouth daily.  docusate sodium (COLACE) 100 mg capsule Take 100 mg by mouth two (2) times a day.  furosemide (LASIX) 40 mg tablet Take 40 mg by mouth daily.  metoprolol tartrate (LOPRESSOR) 50 mg tablet Take  by mouth two (2) times a day.  pantoprazole (PROTONIX) 40 mg tablet Take 40 mg by mouth daily. Takes 1 tab before breakfast    potassium chloride (K-DUR, KLOR-CON) 20 mEq tablet Take 20 mEq by mouth daily.  warfarin (COUMADIN) 2.5 mg tablet Take 2.5 mg by mouth daily.  fluconazole (DIFLUCAN) 150 mg tablet take 1 tablet by mouth as a single dose    cephALEXin (KEFLEX) 500 mg capsule Take 4 tablets one hour prior to dental procedure.  folic acid (FOLVITE) 1 mg tablet Take 1 Tab by mouth daily.  methotrexate (RHEUMATREX) 2.5 mg tablet Take 8 Tabs by mouth every Sunday.  diclofenac EC (VOLTAREN) 75 mg EC tablet Take 1 Tab by mouth two (2) times a day.  albuterol (PROVENTIL HFA, VENTOLIN HFA, PROAIR HFA) 90 mcg/actuation inhaler Take 2 Puffs by inhalation every six (6) hours as needed for Wheezing.  oxybutynin (DITROPAN) 5 mg tablet Take 1 Tab by mouth two (2) times a day.  FLUoxetine (PROZAC) 40 mg capsule Take 1 Cap by mouth daily.  ALPRAZolam (XANAX) 0.5 mg tablet 1 tab po bid as needed for severe anxiety and/or sleep    esomeprazole (NEXIUM) 40 mg capsule Take 1 Cap by mouth daily.     predniSONE (DELTASONE) 1 mg tablet TAKE 5 MG(5 TABLETS) BY MOUTH DAILY. LOWER BY 1 MG( 1 TABLET) EVERY 1 TO 2 WEEKS UNTIL MAINTENANXCE OF 1MG DAILY     No current facility-administered medications for this visit. Social History   Substance Use Topics    Smoking status: Never Smoker    Smokeless tobacco: Never Used    Alcohol use No     Visit Vitals    /84    Pulse 68    Temp 98.1 °F (36.7 °C) (Oral)    Resp 16    Ht 5' 2\" (1.575 m)    Wt 183 lb 6.4 oz (83.2 kg)    SpO2 98%    BMI 33.54 kg/m2     Review of Systems   Constitutional: Negative for chills, fever and malaise/fatigue. Respiratory: Negative for cough and shortness of breath. Cardiovascular: Negative for chest pain, palpitations and leg swelling. All other systems reviewed and are negative. Physical Exam   Constitutional: No distress. Overweight. Neck: Neck supple. Cardiovascular: Normal rate and regular rhythm. Pulmonary/Chest: Effort normal. No respiratory distress. She has wheezes (End inspiratory wheeze LML). She has no rales. She exhibits no tenderness. Abdominal: Soft. She exhibits no distension. There is no tenderness. There is no guarding. Musculoskeletal: She exhibits no edema. Lymphadenopathy:     She has no cervical adenopathy. Neurological: She is alert. Skin: Skin is warm and dry. Psychiatric: She has a normal mood and affect. ASSESSMENT and PLAN  Diagnoses and all orders for this visit:    1. S/P aortic valve replacement  Stable post op. Advised she call surgeon's office and schedule post op follow up visit. 2. Inspiratory wheeze on examination  -     XR CHEST PA LAT; Future  Xray negative for acute process. 3. Anticoagulation management encounter  -     PROTHROMBIN TIME + INR    4.  Other iron deficiency anemia  -     CBC WITH AUTOMATED DIFF  -     METABOLIC PANEL, COMPREHENSIVE  -     MO HANDLG&/OR CONVEY OF SPEC FOR TR OFFICE TO LAB  -     COLLECTION VENOUS BLOOD,VENIPUNCTURE  -     FERRITIN  -     IRON PROFILE  Recheck labs today. 5. Essential hypertension, benign  -     METABOLIC PANEL, COMPREHENSIVE  Controlled. 6. Acute vaginitis  -     fluconazole (DIFLUCAN) 150 mg tablet; take 1 tablet by mouth as a single dose    7. Dental disease  -     cephALEXin (KEFLEX) 500 mg capsule; Take 4 tablets one hour prior to dental procedure. Prophylactic keflex as directed. Follow up 3 months or sooner prn.

## 2018-05-31 NOTE — MR AVS SNAPSHOT
[address] 
 
 
 [address] [facility] 
[phone] Patient: Sidney Pérez MRN: [id number] :1965 Visit Information Date & Time Provider Department Dept. Phone Encounter #  
 2018 10:00 AM Manav Rincon  [facility] [phone] [id number] Your Appointments 7/3/2018  2:40 PM  
ESTABLISHED PATIENT with Janine Menon MD  
[address] ([address]) Appt Note: . ..  
 [facility] [address]  
[phone]  
  
   
 [facility] [address] Upcoming Health Maintenance Date Due  
 BREAST CANCER SCRN MAMMOGRAM 2016 PAP AKA CERVICAL CYTOLOGY 2018 Influenza Age 5 to Adult 2018 DTaP/Tdap/Td series (2 - Td) 2021 COLONOSCOPY 2026 Allergies as of 2018  Review Complete On: 2018 By: Manav Rincon NP Severity Noted Reaction Type Reactions Percocet [Oxycodone-acetaminophen] High 2010   Systemic Hives Reglan [Metoclopramide] High 2015   Systemic Anaphylaxis, Anxiety Ace Inhibitors  2016    Cough Tramadol  2013    Itching Current Immunizations  Reviewed on 2018 Name Date Hepatitis B Vaccine 1998, 1998, 3/24/1998 Influenza High Dose Vaccine PF 10/28/2014 Influenza Vaccine (Quad) PF 3/15/2018, 2016 Influenza Vaccine PF 2013 Influenza Vaccine Split 2012, 2011 PPD 2012,  Incomplete, 2011, 2011 TDAP Vaccine 2011 ZZZ-RETIRED (DO NOT USE) Pneumococcal Vaccine (Unspecified Type) 2012 Not reviewed this visit You Were Diagnosed With   
  
 Codes Comments S/P aortic valve replacement    -  Primary ICD-10-CM: Z95.2 ICD-9-CM: V43.3 Inspiratory wheeze on examination     ICD-10-CM: R06.2 ICD-9-CM: 786.07   
 Anticoagulation management encounter     ICD-10-CM: Z51.81, Z79.01 
ICD-9-CM: V58.83, V58.61 Other iron deficiency anemia     ICD-10-CM: D50.8 ICD-9-CM: 280.8 Essential hypertension, benign     ICD-10-CM: I10 
ICD-9-CM: 401.1 Vitals BP Pulse Temp Resp Height(growth percentile) Weight(growth percentile) 140/84 68 98.1 °F (36.7 °C) (Oral) 16 5' 2\" (1.575 m) 183 lb 6.4 oz (83.2 kg) LMP SpO2 BMI OB Status Smoking Status 04/29/2016 98% 33.54 kg/m2 Postmenopausal Never Smoker Vitals History BMI and BSA Data Body Mass Index Body Surface Area  
 33.54 kg/m 2 1.91 m 2 Preferred Pharmacy Pharmacy Name Phone [address], [address]  [phone] Your Updated Medication List  
  
   
This list is accurate as of 5/31/18 10:45 AM.  Always use your most recent med list.  
  
  
  
  
 albuterol 90 mcg/actuation inhaler Commonly known as:  PROVENTIL HFA, VENTOLIN HFA, PROAIR HFA Take 2 Puffs by inhalation every six (6) hours as needed for Wheezing. ALPRAZolam 0.5 mg tablet Commonly known as:  XANAX  
1 tab po bid as needed for severe anxiety and/or sleep  
  
 aspirin delayed-release 81 mg tablet Take 81 mg by mouth daily. diclofenac EC 75 mg EC tablet Commonly known as:  VOLTAREN Take 1 Tab by mouth two (2) times a day. docusate sodium 100 mg capsule Commonly known as:  Terrell Jayson Take 100 mg by mouth two (2) times a day. esomeprazole 40 mg capsule Commonly known as:  NexIUM Take 1 Cap by mouth daily. fluconazole 150 mg tablet Commonly known as:  DIFLUCAN  
take 1 tablet by mouth as a single dose FLUoxetine 40 mg capsule Commonly known as:  PROzac Take 1 Cap by mouth daily. folic acid 1 mg tablet Commonly known as:  Google Take 1 Tab by mouth daily. LASIX 40 mg tablet Generic drug:  furosemide Take 40 mg by mouth daily. methotrexate 2.5 mg tablet Commonly known as:  Mohsenrimayte Alfredo Take 8 Tabs by mouth every Sunday. metoprolol tartrate 50 mg tablet Commonly known as:  LOPRESSOR Take  by mouth two (2) times a day. oxybutynin 5 mg tablet Commonly known as:  ZEHHKDSI Take 1 Tab by mouth two (2) times a day. pantoprazole 40 mg tablet Commonly known as:  PROTONIX Take 40 mg by mouth daily. Takes 1 tab before breakfast  
  
 potassium chloride 20 mEq tablet Commonly known as:  K-DUR, KLOR-CON Take 20 mEq by mouth daily. predniSONE 1 mg tablet Commonly known as:  DELTASONE  
TAKE 5 MG(5 TABLETS) BY MOUTH DAILY. LOWER BY 1 MG( 1 TABLET) EVERY 1 TO 2 WEEKS UNTIL MAINTENANXCE OF 1MG DAILY  
  
 TYLENOL 325 mg tablet Generic drug:  acetaminophen Take 325 mg by mouth every six (6) hours as needed for Pain. VITAMIN D3 1,000 unit tablet Generic drug:  cholecalciferol Take  by mouth daily. warfarin 2.5 mg tablet Commonly known as:  COUMADIN Take 2.5 mg by mouth daily. We Performed the Following CBC WITH AUTOMATED DIFF [07785 CPT(R)] COLLECTION VENOUS BLOOD,VENIPUNCTURE [id number] CPT(R)] FERRITIN [39322 CPT(R)] IRON PROFILE [id number] CPT(R)] METABOLIC PANEL, COMPREHENSIVE [43390 CPT(R)] MI HANDLG&/OR CONVEY OF SPEC FOR TR OFFICE TO LAB [40770 CPT(R)] PROTHROMBIN TIME + INR [21422 CPT(R)] Introducing [facility]! Fiordaliza Holman introduces [employer] patient portal. Now you can access parts of your medical record, email your doctor's office, and request medication refills online. 1. In your internet browser, go to [email or web address] 2. Click on the First Time User? Click Here link in the Sign In box. You will see the New Member Sign Up page. 3. Enter your [employer] Access Code exactly as it appears below. You will not need to use this code after youve completed the sign-up process.  If you do not sign up before the expiration date, you must request a new code. · [employer] Access Code: [email or web address] Expires: 6/13/2018 10:23 AM 
 
4. Enter the last four digits of your Social Security Number (xxxx) and Date of Birth (mm/dd/yyyy) as indicated and click Submit. You will be taken to the next sign-up page. 5. Create a [employer] ID. This will be your [employer] login ID and cannot be changed, so think of one that is secure and easy to remember. 6. Create a [employer] password. You can change your password at any time. 7. Enter your Password Reset Question and Answer. This can be used at a later time if you forget your password. 8. Enter your e-mail address. You will receive e-mail notification when new information is available in [address] 9. Click Sign Up. You can now view and download portions of your medical record. 10. Click the Download Summary menu link to download a portable copy of your medical information. If you have questions, please visit the Frequently Asked Questions section of the [employer] website. Remember, [employer] is NOT to be used for urgent needs. For medical emergencies, dial 911. Now available from your iPhone and Android! Please provide this summary of care documentation to your next provider. Your primary care clinician is listed as Mearl Perquimans. If you have any questions after today's visit, please call [phone].

## 2018-06-05 NOTE — PROGRESS NOTES
Please inform patient that INR is at goal.  Continue current dose of coumadin. Will need to recheck in 1 month. Lab only visit. Anemia has improved. Recommend taking OTC iron supplement daily and eat iron enriched foods to prevent anemia.

## 2018-06-05 NOTE — PROGRESS NOTES
Called and spoke with patient. Discussed results. Gave Dimple's instructions. Patient verbalized understanding    Patient states that she has been getting iron infusions for the past 2 years and would like to know if she should continue. Patient states that she was scheduled for an infusion but missed her appointment due to the heart surgery. Please advise if she should continue infusions or take supplement.

## 2018-07-03 NOTE — PATIENT INSTRUCTIONS
Rituximab (By injection)   Rituximab (ut-JCO-z-mab)  Treats rheumatoid arthritis (RA), Wegener granulomatosis, and microscopic polyangiitis (MPA). Also treats cancer, including lymphoma and leukemia. Brand Name(s): Rituxan   There may be other brand names for this medicine. When This Medicine Should Not Be Used: You should not receive this medicine if you have had an allergic reaction to rituximab or other murine (mice or rat) proteins. How to Use This Medicine:   Injectable  · Medicines used to treat cancer are very strong and can have many side effects. Before receiving this medicine, make sure you understand all the risks and benefits. It is important for you to work closely with your doctor during your treatment. · You will receive this medicine while you are in a hospital or cancer treatment center. A nurse or other trained health professional will give you this medicine. · Your doctor will prescribe your dose and schedule. This medicine is given through a needle placed in a vein. · Rituximab must be given slowly, so the needle will remain in place for a few hours. You may also receive medicines (such as acetaminophen, antihistamines) to help prevent possible allergic reactions to the injection. · You will be watched closely for unwanted side effects while you are receiving this medicine. · This medicine should come with a Medication Guide. Ask your pharmacist for a copy if you do not have one. If a dose is missed:   · Call your doctor or pharmacist for instructions. Drugs and Foods to Avoid:   Ask your doctor or pharmacist before using any other medicine, including over-the-counter medicines, vitamins, and herbal products. · Make sure your doctor knows if you are also receiving cisplatin (Annalisa Jones Ultramar 112). Tell your doctor if you have taken other medicines to treat rheumatoid arthritis such as adalimumab (Humira®), etanercept (Enbrel®), or infliximab (Remicade®).   · This medicine may interfere with vaccines. Ask your doctor before you get a flu shot or any other vaccines. Some vaccines may be given 4 weeks before a rituximab injection. Warnings While Using This Medicine:   · Make sure your doctor knows if you are pregnant or planning to become pregnant. You must use an effective form of birth control to prevent pregnancy. You should not become pregnant while you are receiving this medicine and for 12 months after stopping it. · Make sure your doctor knows if you are breastfeeding, or if you have kidney disease, liver disease (including hepatitis B), chest pain (angina), heart disease, heart rhythm problems, or lung problems. Also tell your doctor if you have a weak immune system or any type of infection. · Tell your doctor if you have had a reaction to murine (mice or rat) proteins. Murine proteins are also used in other medicines. · Rituximab may cause a serious side effect called an infusion reaction. This can be life-threatening and requires immediate medical attention. Check with your doctor or nurse right away if you have a rash, itching, swelling of the face, tongue, and throat, trouble breathing, or chest pain. · This medicine may cause a serious reaction called tumor lysis syndrome. Call your doctor right away if you have changes in how often you urinate, rapid weight gain, swelling of the feet or lower legs, uneven heartbeat, or seizures. · If you have a severe skin reaction, tell your doctor right away. Symptoms may include blistering, peeling, or loosening of the skin, red skin lesions, severe acne or skin rash, sores or ulcers on the skin, or fever or chills. · This medicine may increase your risk of infections during treatment and up to a year after treatment. These infections can be severe and lead to death. Avoid being near people who are sick. Wash your hands often. Tell your doctor if you have lupus or have ever had an infection that kept coming back.   · Call your doctor right away if you have a cough that won't go away, weight loss, night sweats, fever, chills, flu-like symptoms (such as a runny or stuffy nose, headache, blurred vision, or feeling generally ill), painful or difficult urination, or sores, ulcers, or white spots in the mouth or on the lips. These may be signs of infection. · This medicine may cause a rare and serious brain infection called progressive multifocal leukoencephalopathy (PML). The risk for getting this infection is higher if you have rheumatoid arthritis. Check with your doctor right away if you have more than one of these symptoms: vision changes, loss of coordination, clumsiness, memory loss, difficulty speaking or understanding what others say, and weakness in the legs. · Check with your doctor right away if you have any symptoms of liver problems, including yellow skin and eyes, dark urine or pale stools, nausea and vomiting, or upper stomach pain. · Check with your doctor right away if you have abdominal pain. This medicine could cause serious stomach and bowel problems, such as a bowel obstruction or a hole in your bowel. · Your doctor will do lab tests at regular visits to check on the effects of this medicine. Keep all appointments.   Possible Side Effects While Using This Medicine:   Call your doctor right away if you notice any of these side effects:  · Allergic reaction: Itching or hives, swelling in your face or hands, swelling or tingling in your mouth or throat, chest tightness, trouble breathing  · Blistering, peeling, red skin rash  · Bloody vomit or vomit that looks like coffee grounds, severe stomach pain  · Changes in vision  · Chest pain, uneven heartbeat, sudden fainting  · Confusion, body weakness, shortness of breath, numbness or tingling in your hands, feet, or lips  · Dark-colored urine or pale stools, nausea, vomiting, loss of appetite, pain in your upper stomach, yellow eyes or skin  · Decrease in how much or how often you urinate, pain while urinating  · Dizziness, lightheadedness, drowsiness  · Fever, chills, cough, sore throat, and body aches  · Joint pain, stiffness, or swelling  · Problems with coordination or speech  · Rapid weight gain, swelling in your hands, ankles, or feet  · Sores, ulcers, or white spots in the mouth or on the lips  · Unusual bleeding or bruising  · Unusual tiredness or weakness  If you notice these less serious side effects, talk with your doctor:   · Headache  · Mild skin rash or itching  · Pain, itching, burning, swelling, or a lump under your skin where the needle is placed  If you notice other side effects that you think are caused by this medicine, tell your doctor. Call your doctor for medical advice about side effects. You may report side effects to FDA at 6-899-FDA-3307  © 2017 [address]  Information is for End User's use only and may not be sold, redistributed or otherwise used for commercial purposes. The above information is an  only. It is not intended as medical advice for individual conditions or treatments. Talk to your doctor, nurse or pharmacist before following any medical regimen to see if it is safe and effective for you. Tocilizumab (By injection)   Tocilizumab (toe-si-ARTIS-oo-mab)  Treats rheumatoid arthritis, giant cell arteritis, polyarticular juvenile idiopathic arthritis, systemic juvenile idiopathic arthritis, and cytokine release syndrome. Brand Name(s): Actemra   There may be other brand names for this medicine. When This Medicine Should Not Be Used: This medicine is not right for everyone. Do not use it if you had an allergic reaction to tocilizumab. How to Use This Medicine:   Injectable  · Your doctor will prescribe your dose and schedule. This medicine is given through a needle placed into a vein or as a shot under your skin.   · If tocilizumab is injected into a vein, it must be given slowly, so the needle will have to stay in place for at least 1 hour. · A nurse or other health provider will give you this medicine. · You may be taught how to give your medicine at home. Make sure you understand all instructions before giving yourself an injection. Do not use more medicine or use it more often than your doctor tells you to. · Allow the medicine to warm to room temperature for 30 minutes before using it. · Check the liquid in the prefilled syringe. It should be clear and colorless or slightly yellow. Do not use this medicine if it is cloudy, discolored, or if you see particles in it. · You will be shown the body areas where this shot can be given. Use a different body area each time you give yourself a shot. Keep track of where you give each shot to make sure you rotate body areas. Do not inject into skin areas that are red, bruised, tender, hard, or not intact. · Use a new needle and syringe each time you inject your medicine. · This medicine should come with a Medication Guide. Ask your pharmacist for a copy if you do not have one. · Missed dose: Take a dose as soon as you remember. If it is almost time for your next dose, wait until then and take a regular dose. Do not take extra medicine to make up for a missed dose. · If you store this medicine at home, keep it in the refrigerator. Do not freeze. Protect the medicine from direct light. Keep it in its original package until you are ready to use it. Drugs and Foods to Avoid:   Ask your doctor or pharmacist before using any other medicine, including over-the-counter medicines, vitamins, and herbal products. · Some medicines can affect how tocilizumab works.  Tell your doctor if you are using any of the following:  ¨ Abatacept, adalimumab, anakinra, certolizumab, cyclosporine, etanercept, golimumab, infliximab, methotrexate, omeprazole, rituximab, theophylline  ¨ Birth control pills  ¨ Blood thinner (including warfarin)  ¨ Medicine that weakens the immune system (including a cancer or steroid medicine)  ¨ Medicine to lower cholesterol (including atorvastatin, lovastatin, simvastatin)  ¨ NSAID pain or arthritis medicine (including aspirin, diclofenac, ibuprofen, naproxen)  · This medicine may interfere with vaccines. Ask your doctor before you get a flu shot or any other vaccines. Warnings While Using This Medicine:   · Tell your doctor if you are pregnant or breastfeeding, or if you have liver disease, diabetes, high cholesterol, multiple sclerosis, stomach or bowel disease (including diverticulitis, ulcers), or a weak immune system (including HIV, cancer). Tell your doctor if you have any type of infection (including hepatitis B or tuberculosis) or an infection that keeps coming back. · This medicine may cause the following problems:  ¨ Increased risk for serious infections  ¨ Stomach or bowel problems  ¨ Increased risk of cancer  ¨ Serious skin reactions  ¨ High cholesterol in the blood  · You will need to have a skin test for tuberculosis (TB) before you start using this medicine. Tell your doctor if you or anyone in your home has ever had a positive TB skin test or has been exposed to TB. · This medicine may make you bleed, bruise, or get infections more easily. Take precautions to prevent illness and injury. Wash your hands often. · Your doctor will do lab tests at regular visits to check on the effects of this medicine. Keep all appointments. · Throw away used needles in a hard, closed container that the needles cannot poke through. Keep this container away from children and pets. · Keep all medicine out of the reach of children. Never share your medicine with anyone.   Possible Side Effects While Using This Medicine:   Call your doctor right away if you notice any of these side effects:  · Allergic reaction: Itching or hives, swelling in your face or hands, swelling or tingling in your mouth or throat, chest tightness, trouble breathing  · Blistering, peeling, red skin rash  · Bloody, black, or tarry stools, severe stomach pain, diarrhea  · Change in how much or how often you urinate  · Chest pain, trouble breathing  · Dark urine or pale stools, nausea, vomiting, loss of appetite, stomach pain, yellow skin or eyes  · Fast, slow, or pounding heartbeat  · Fever, chills, cough, stuffy or runny nose, sore throat, body aches  · Unusual bleeding, bruising, weakness  · Vomiting of material that looks like coffee grounds  If you notice these less serious side effects, talk with your doctor:   · Headache  · Pain, itching, burning, swelling, bleeding, or a lump under your skin where the needle was placed or the shot was given  If you notice other side effects that you think are caused by this medicine, tell your doctor. Call your doctor for medical advice about side effects. You may report side effects to FDA at 0-290-FDA-1224  © 2017 [address] Information is for End User's use only and may not be sold, redistributed or otherwise used for commercial purposes. The above information is an  only. It is not intended as medical advice for individual conditions or treatments. Talk to your doctor, nurse or pharmacist before following any medical regimen to see if it is safe and effective for you.

## 2018-07-03 NOTE — MR AVS SNAPSHOT
[address] [facility] [address] [place] [address] 
[phone] Patient: Michi Pacheco MRN: [id number] :1965 Visit Information Date & Time Provider Department Dept. Phone Encounter #  
 7/3/2018  2:40 PM Zuleyma Porter, [address] [id number] Follow-up Instructions Return in about 3 months (around 10/3/2018). Your Appointments 7/3/2018  2:40 PM  
ACUTE CARE with Zuleyma Porter MD  
[address] ([facility]) Appt Note: ok per Lieutenant Feliz, he already reviewed chart -kfb; 18 pt confirmed apt. [address] [facility] [address]  
[phone]  
  
   
 [address] [facility] [address] Upcoming Health Maintenance Date Due  
 BREAST CANCER SCRN MAMMOGRAM 2016 PAP AKA CERVICAL CYTOLOGY 2018 Influenza Age 5 to Adult 2018 DTaP/Tdap/Td series (2 - Td) 2021 COLONOSCOPY 2026 Allergies as of 7/3/2018  Review Complete On: 7/3/2018 By: Zuleyma Porter MD  
  
 Severity Noted Reaction Type Reactions Percocet [Oxycodone-acetaminophen] High 2010   Systemic Hives Reglan [Metoclopramide] High 2015   Systemic Anaphylaxis, Anxiety Ace Inhibitors  2016    Cough Tramadol  2013    Itching Current Immunizations  Reviewed on 2018 Name Date Hepatitis B Vaccine 1998, 1998, 3/24/1998 Influenza High Dose Vaccine PF 10/28/2014 Influenza Vaccine (Quad) PF 3/15/2018, 2016 Influenza Vaccine PF 2013 Influenza Vaccine Split 2012, 2011 PPD 2012,  Incomplete, 2011, 2011 TDAP Vaccine 2011 ZZZ-RETIRED (DO NOT USE) Pneumococcal Vaccine (Unspecified Type) 2012 Not reviewed this visit You Were Diagnosed With   
  
 Codes Comments Rheumatoid arthritis with unknown rheumatoid factor status ([facility]    -  Primary ICD-10-CM: M06.9 ICD-9-CM: 714.0 Long-term use of immunosuppressant medication     ICD-10-CM: Z79.899 ICD-9-CM: V58.69 Disorder of bone     ICD-10-CM: M89.9 ICD-9-CM: 733.90 Vitals BP Pulse Temp Resp Height(growth percentile) Weight(growth percentile) (!) 147/98 92 98.2 °F (36.8 °C) 18 5' 2\" (1.575 m) 187 lb (84.8 kg) LMP BMI OB Status Smoking Status 04/29/2016 34.2 kg/m2 Postmenopausal Never Smoker Vitals History BMI and BSA Data Body Mass Index Body Surface Area  
 34.2 kg/m 2 1.93 m 2 Preferred Pharmacy Pharmacy Name Phone [facility], [address]  [phone] Your Updated Medication List  
  
   
This list is accurate as of 7/3/18  2:37 PM.  Always use your most recent med list.  
  
  
  
  
 albuterol 90 mcg/actuation inhaler Commonly known as:  PROVENTIL HFA, VENTOLIN HFA, PROAIR HFA Take 2 Puffs by inhalation every six (6) hours as needed for Wheezing. ALPRAZolam 0.5 mg tablet Commonly known as:  XANAX  
1 tab po bid as needed for severe anxiety and/or sleep  
  
 aspirin delayed-release 81 mg tablet Take 81 mg by mouth daily. diclofenac EC 75 mg EC tablet Commonly known as:  VOLTAREN Take 1 Tab by mouth two (2) times a day. docusate sodium 100 mg capsule Commonly known as:  Lucetta Espinoza Take 1 Cap by mouth two (2) times daily as needed for Constipation. fluconazole 150 mg tablet Commonly known as:  DIFLUCAN  
take 1 tablet by mouth as a single dose FLUoxetine 40 mg capsule Commonly known as:  PROzac Take 1 Cap by mouth daily. folic acid 1 mg tablet Commonly known as:  Google Take 1 Tab by mouth daily. furosemide 40 mg tablet Commonly known as:  LASIX Take 1 Tab by mouth daily. methotrexate 2.5 mg tablet Commonly known as:  Hakeem Prayer Take 8 Tabs by mouth every Sunday. Start taking on:  7/8/2018  
  
 metoprolol tartrate 50 mg tablet Commonly known as:  LOPRESSOR Take 1 Tab by mouth two (2) times a day. oxybutynin 5 mg tablet Commonly known as:  WDGECNII Take 1 Tab by mouth two (2) times a day. pantoprazole 40 mg tablet Commonly known as:  PROTONIX Takes 1 tab before breakfast  
  
 potassium chloride 20 mEq tablet Commonly known as:  K-DUR, KLOR-CON Take 1 Tab by mouth daily. predniSONE 5 mg tablet Commonly known as:  [place] Donnie Take 1 Tab by mouth daily. TYLENOL 325 mg tablet Generic drug:  acetaminophen Take 325 mg by mouth every six (6) hours as needed for Pain. VITAMIN D3 1,000 unit tablet Generic drug:  cholecalciferol Take  by mouth daily. warfarin 2.5 mg tablet Commonly known as:  COUMADIN Take 1 Tab by mouth daily. Prescriptions Sent to Pharmacy Refills  
 methotrexate (RHEUMATREX) 2.5 mg tablet 0 Starting on: 7/8/2018 Sig: Take 8 Tabs by mouth every Sunday. Class: Normal  
 Pharmacy: [address] Ph #: [phone] Route: Oral  
 predniSONE (DELTASONE) 5 mg tablet 0 Sig: Take 1 Tab by mouth daily. Class: Normal  
 Pharmacy: [address] Ph #: [phone] Route: Oral  
  
We Performed the Following C REACTIVE PROTEIN, QT [99508 CPT(R)] CHRONIC HEPATITIS PANEL [RLD4107 Custom] [address], IGG F4075925 CPT(R)] PROTEIN ELECTROPHORESIS W/ REFLX YI [[id number] Custom] QUANTIFERON TB GOLD [[id number] Custom] RHEUMATOID FACTOR, QL G9445985 CPT(R)] SED RATE (ESR) A2490533 CPT(R)] URIC ACID E9682692 CPT(R)] VITAMIN D, 25 HYDROXY [id number] CPT(R)] Follow-up Instructions Return in about 3 months (around 10/3/2018). To-Do List   
 07/03/2018 Imaging:  XR FOOT LT MIN 3 V   
  
 07/03/2018 Imaging:  XR FOOT RT MIN 3 V   
  
 07/03/2018 Imaging:  XR HAND LT MIN 3 V   
  
 07/03/2018 Imaging:  XR HAND RT MIN 3 V Patient Instructions Rituximab (By injection) Rituximab (fy-QSD-z-mab) Treats rheumatoid arthritis (RA), Wegener granulomatosis, and microscopic polyangiitis (MPA). Also treats cancer, including lymphoma and leukemia. Brand Name(s): Rituxan There may be other brand names for this medicine. When This Medicine Should Not Be Used: You should not receive this medicine if you have had an allergic reaction to rituximab or other murine (mice or rat) proteins. How to Use This Medicine:  
Injectable · Medicines used to treat cancer are very strong and can have many side effects. Before receiving this medicine, make sure you understand all the risks and benefits. It is important for you to work closely with your doctor during your treatment. · You will receive this medicine while you are in a hospital or cancer treatment center. A nurse or other trained health professional will give you this medicine. · Your doctor will prescribe your dose and schedule. This medicine is given through a needle placed in a vein. · Rituximab must be given slowly, so the needle will remain in place for a few hours. You may also receive medicines (such as acetaminophen, antihistamines) to help prevent possible allergic reactions to the injection. · You will be watched closely for unwanted side effects while you are receiving this medicine. · This medicine should come with a Medication Guide. Ask your pharmacist for a copy if you do not have one. If a dose is missed:  
· Call your doctor or pharmacist for instructions. Drugs and Foods to Avoid: Ask your doctor or pharmacist before using any other medicine, including over-the-counter medicines, vitamins, and herbal products. · Make sure your doctor knows if you are also receiving cisplatin (Annalisa Jones Ultramar 112). Tell your doctor if you have taken other medicines to treat rheumatoid arthritis such as adalimumab (Humira®), etanercept (Enbrel®), or infliximab (Remicade®). · This medicine may interfere with vaccines. Ask your doctor before you get a flu shot or any other vaccines. Some vaccines may be given 4 weeks before a rituximab injection. Warnings While Using This Medicine: · Make sure your doctor knows if you are pregnant or planning to become pregnant. You must use an effective form of birth control to prevent pregnancy. You should not become pregnant while you are receiving this medicine and for 12 months after stopping it. · Make sure your doctor knows if you are breastfeeding, or if you have kidney disease, liver disease (including hepatitis B), chest pain (angina), heart disease, heart rhythm problems, or lung problems. Also tell your doctor if you have a weak immune system or any type of infection. · Tell your doctor if you have had a reaction to murine (mice or rat) proteins. Murine proteins are also used in other medicines. · Rituximab may cause a serious side effect called an infusion reaction. This can be life-threatening and requires immediate medical attention. Check with your doctor or nurse right away if you have a rash, itching, swelling of the face, tongue, and throat, trouble breathing, or chest pain. · This medicine may cause a serious reaction called tumor lysis syndrome. Call your doctor right away if you have changes in how often you urinate, rapid weight gain, swelling of the feet or lower legs, uneven heartbeat, or seizures. · If you have a severe skin reaction, tell your doctor right away. Symptoms may include blistering, peeling, or loosening of the skin, red skin lesions, severe acne or skin rash, sores or ulcers on the skin, or fever or chills. · This medicine may increase your risk of infections during treatment and up to a year after treatment. These infections can be severe and lead to death. Avoid being near people who are sick. Wash your hands often. Tell your doctor if you have lupus or have ever had an infection that kept coming back. · Call your doctor right away if you have a cough that won't go away, weight loss, night sweats, fever, chills, flu-like symptoms (such as a runny or stuffy nose, headache, blurred vision, or feeling generally ill), painful or difficult urination, or sores, ulcers, or white spots in the mouth or on the lips. These may be signs of infection. · This medicine may cause a rare and serious brain infection called progressive multifocal leukoencephalopathy (PML). The risk for getting this infection is higher if you have rheumatoid arthritis. Check with your doctor right away if you have more than one of these symptoms: vision changes, loss of coordination, clumsiness, memory loss, difficulty speaking or understanding what others say, and weakness in the legs. · Check with your doctor right away if you have any symptoms of liver problems, including yellow skin and eyes, dark urine or pale stools, nausea and vomiting, or upper stomach pain. · Check with your doctor right away if you have abdominal pain. This medicine could cause serious stomach and bowel problems, such as a bowel obstruction or a hole in your bowel. · Your doctor will do lab tests at regular visits to check on the effects of this medicine. Keep all appointments. Possible Side Effects While Using This Medicine:  
Call your doctor right away if you notice any of these side effects: · Allergic reaction: Itching or hives, swelling in your face or hands, swelling or tingling in your mouth or throat, chest tightness, trouble breathing · Blistering, peeling, red skin rash · Bloody vomit or vomit that looks like coffee grounds, severe stomach pain · Changes in vision · Chest pain, uneven heartbeat, sudden fainting · Confusion, body weakness, shortness of breath, numbness or tingling in your hands, feet, or lips · Dark-colored urine or pale stools, nausea, vomiting, loss of appetite, pain in your upper stomach, yellow eyes or skin · Decrease in how much or how often you urinate, pain while urinating · Dizziness, lightheadedness, drowsiness · Fever, chills, cough, sore throat, and body aches · Joint pain, stiffness, or swelling · Problems with coordination or speech · Rapid weight gain, swelling in your hands, ankles, or feet · Sores, ulcers, or white spots in the mouth or on the lips · Unusual bleeding or bruising · Unusual tiredness or weakness If you notice these less serious side effects, talk with your doctor:  
· Headache · Mild skin rash or itching · Pain, itching, burning, swelling, or a lump under your skin where the needle is placed If you notice other side effects that you think are caused by this medicine, tell your doctor. Call your doctor for medical advice about side effects. You may report side effects to FDA at 9-151-FDA-3906 © 2017 [address] Information is for End User's use only and may not be sold, redistributed or otherwise used for commercial purposes. The above information is an  only. It is not intended as medical advice for individual conditions or treatments. Talk to your doctor, nurse or pharmacist before following any medical regimen to see if it is safe and effective for you. Tocilizumab (By injection) Tocilizumab (toe-si-ARTIS-oo-mab) Treats rheumatoid arthritis, giant cell arteritis, polyarticular juvenile idiopathic arthritis, systemic juvenile idiopathic arthritis, and cytokine release syndrome. Brand Name(s): Actemra There may be other brand names for this medicine. When This Medicine Should Not Be Used: This medicine is not right for everyone. Do not use it if you had an allergic reaction to tocilizumab. How to Use This Medicine:  
Injectable · Your doctor will prescribe your dose and schedule. This medicine is given through a needle placed into a vein or as a shot under your skin. · If tocilizumab is injected into a vein, it must be given slowly, so the needle will have to stay in place for at least 1 hour. · A nurse or other health provider will give you this medicine. · You may be taught how to give your medicine at home. Make sure you understand all instructions before giving yourself an injection. Do not use more medicine or use it more often than your doctor tells you to. · Allow the medicine to warm to room temperature for 30 minutes before using it. · Check the liquid in the prefilled syringe. It should be clear and colorless or slightly yellow. Do not use this medicine if it is cloudy, discolored, or if you see particles in it. · You will be shown the body areas where this shot can be given. Use a different body area each time you give yourself a shot. Keep track of where you give each shot to make sure you rotate body areas. Do not inject into skin areas that are red, bruised, tender, hard, or not intact. · Use a new needle and syringe each time you inject your medicine. · This medicine should come with a Medication Guide. Ask your pharmacist for a copy if you do not have one. · Missed dose: Take a dose as soon as you remember. If it is almost time for your next dose, wait until then and take a regular dose. Do not take extra medicine to make up for a missed dose. · If you store this medicine at home, keep it in the refrigerator. Do not freeze. Protect the medicine from direct light. Keep it in its original package until you are ready to use it. Drugs and Foods to Avoid: Ask your doctor or pharmacist before using any other medicine, including over-the-counter medicines, vitamins, and herbal products. · Some medicines can affect how tocilizumab works. Tell your doctor if you are using any of the following: ¨ Abatacept, adalimumab, anakinra, certolizumab, cyclosporine, etanercept, golimumab, infliximab, methotrexate, omeprazole, rituximab, theophylline ¨ Birth control pills ¨ Blood thinner (including warfarin) ¨ Medicine that weakens the immune system (including a cancer or steroid medicine) ¨ Medicine to lower cholesterol (including atorvastatin, lovastatin, simvastatin) ¨ NSAID pain or arthritis medicine (including aspirin, diclofenac, ibuprofen, naproxen) · This medicine may interfere with vaccines. Ask your doctor before you get a flu shot or any other vaccines. Warnings While Using This Medicine: · Tell your doctor if you are pregnant or breastfeeding, or if you have liver disease, diabetes, high cholesterol, multiple sclerosis, stomach or bowel disease (including diverticulitis, ulcers), or a weak immune system (including HIV, cancer). Tell your doctor if you have any type of infection (including hepatitis B or tuberculosis) or an infection that keeps coming back. · This medicine may cause the following problems: 
¨ Increased risk for serious infections ¨ Stomach or bowel problems ¨ Increased risk of cancer ¨ Serious skin reactions ¨ High cholesterol in the blood · You will need to have a skin test for tuberculosis (TB) before you start using this medicine. Tell your doctor if you or anyone in your home has ever had a positive TB skin test or has been exposed to TB. · This medicine may make you bleed, bruise, or get infections more easily. Take precautions to prevent illness and injury. Wash your hands often. · Your doctor will do lab tests at regular visits to check on the effects of this medicine. Keep all appointments.  
· Throw away used needles in a hard, closed container that the needles cannot poke through. Keep this container away from children and pets. · Keep all medicine out of the reach of children. Never share your medicine with anyone. Possible Side Effects While Using This Medicine:  
Call your doctor right away if you notice any of these side effects: · Allergic reaction: Itching or hives, swelling in your face or hands, swelling or tingling in your mouth or throat, chest tightness, trouble breathing · Blistering, peeling, red skin rash · Bloody, black, or tarry stools, severe stomach pain, diarrhea · Change in how much or how often you urinate · Chest pain, trouble breathing · Dark urine or pale stools, nausea, vomiting, loss of appetite, stomach pain, yellow skin or eyes · Fast, slow, or pounding heartbeat · Fever, chills, cough, stuffy or runny nose, sore throat, body aches · Unusual bleeding, bruising, weakness · Vomiting of material that looks like coffee grounds If you notice these less serious side effects, talk with your doctor:  
· Headache · Pain, itching, burning, swelling, bleeding, or a lump under your skin where the needle was placed or the shot was given If you notice other side effects that you think are caused by this medicine, tell your doctor. Call your doctor for medical advice about side effects. You may report side effects to FDA at 5-814-FDA-9638 © 2017 [address] Information is for End User's use only and may not be sold, redistributed or otherwise used for commercial purposes. The above information is an  only. It is not intended as medical advice for individual conditions or treatments. Talk to your doctor, nurse or pharmacist before following any medical regimen to see if it is safe and effective for you. Introducing [facility]! [facility] introduces [employer] patient portal. Now you can access parts of your medical record, email your doctor's office, and request medication refills online. 1. In your internet browser, go to [email or web address] 2. Click on the First Time User? Click Here link in the Sign In box. You will see the New Member Sign Up page. 3. Enter your [employer] Access Code exactly as it appears below. You will not need to use this code after youve completed the sign-up process. If you do not sign up before the expiration date, you must request a new code. · [employer] Access Code: [email or web address] Expires: 10/1/2018  2:35 PM 
 
4. Enter the last four digits of your Social Security Number (xxxx) and Date of Birth (mm/dd/yyyy) as indicated and click Submit. You will be taken to the next sign-up page. 5. Create a [employer] ID. This will be your [employer] login ID and cannot be changed, so think of one that is secure and easy to remember. 6. Create a [employer] password. You can change your password at any time. 7. Enter your Password Reset Question and Answer. This can be used at a later time if you forget your password. 8. Enter your e-mail address. You will receive e-mail notification when new information is available in [address] 9. Click Sign Up. You can now view and download portions of your medical record. 10. Click the Download Summary menu link to download a portable copy of your medical information. If you have questions, please visit the Frequently Asked Questions section of the [employer] website. Remember, [employer] is NOT to be used for urgent needs. For medical emergencies, dial 911. Now available from your iPhone and Android! Please provide this summary of care documentation to your next provider. Your primary care clinician is listed as Azam Doll. If you have any questions after today's visit, please call [phone].

## 2018-07-03 NOTE — PROGRESS NOTES
REASON FOR VISIT    This is a Dr. Fatoumata Antonio follow-up visit for Ms. Annette Wise for Erosive Rheumatoid Arthritis. Inflammatory arthritis phenotype includes:  Anti-CCP positive: unknown  Rheumatoid factor positive: unknown  Erosive disease: yes  Extra-articular manifestations include: unknown     Immunosuppression Screening (N/A):  Quantiferon TB: negative (11/29/2016)  PPD:  Negative (6/13/2012)  Hepatitis B: pending  Hepatitis C: pending     Therapy History includes:  Current DMARD therapy include: methotrexate 20 mg every Sunday  Prior DMARD therapy include: Enbrel, Humira, sulfasalazine 500 mg daily, Xeljanz 5 mg twice daily (1/2017 to 4/2018)  Discontinued DMARDs because of inefficacy: Enbrel  Discontinued DMARDs because of side effects: sulfasalazine (migraines with higher dosing), Humira (psoriasis)    Immunization History   Administered Date(s) Administered    Hepatitis B Vaccine 03/24/1998, 04/21/1998, 09/25/1998    Influenza High Dose Vaccine PF 10/28/2014    Influenza Vaccine (Quad) PF 09/26/2016, 03/15/2018    Influenza Vaccine PF 11/08/2013    Influenza Vaccine Split 11/23/2011, 11/12/2012    PPD 09/07/2011, 09/14/2011, 06/11/2012    TDAP Vaccine 11/23/2011    ZZZ-RETIRED (DO NOT USE) Pneumococcal Vaccine (Unspecified Type) 11/19/2012       Patient Active Problem List   Diagnosis Code    GERD (gastroesophageal reflux disease) K21.9    Essential hypertension, benign I10    RA (rheumatoid arthritis) ([facility] [address] M06.9    Long-term use of immunosuppressant medication Z79.899    Long term current use of systemic steroids Z79.52    Iron deficiency anemia D50.9    Depression F32.9    Dependent edema R60.9    Severe obesity (BMI 35.0-39. 9) with comorbidity ([address] E66.01       HISTORY OF PRESENT ILLNESS    Ms. Annette Wise returns for a follow-up. I reviewed her medical record, including Dr. Sukh Bolton office notes, laboratories and imaging and summarized them in this note.     On her visit with Dr. Fatoumata Antonio on 8/14/2017 \"for follow up of rheumatoid arthritis. She has pain mainly in the knees. She relates joint stiffness ( 30-45 minutes in the AM ). She has no joint swelling. She has not had fevers. She has been taking prednisone 5 mg daily (tapered from 4 mg daily directly to 1 mg daily with flare). She is taking methotrexate 20 mg weekly. She is taking sulfasalazine 500 mg daily (migraines with higher dosing). She is taking Xeljanz 5 mg twice daily. She takes diclofenac 75 mg perhaps weekly. She is working on losing weight. She is trying to walk more frequently/ being more active. Of note, she has persistent difficulty swallowing large pills and solids. \" Under assessment, he wrote \"reducible ulnar deviation on exam. Symptoms were controlled with prednisone 5 mg daily, methotrexate 20 mg weekly, and Enbrel 50 mg weekly, along with SSZ 1 gram BID. Symptoms had flared and she had stopped Enbrel. She began [place] 5 mg bid in January 2017. She has taken  mg once daily due to headaches after taking the medication. She had dropped prednisone from 4 mg daily to 1 mg daily and returned to 5 mg daily after a flare. RA seems well controlled at present. \"     She is on prednisone 5 mg daily and methotrexate 20 mg weekly. She stopped sulfasalazine 500 mg daily, Xeljanz 5 mg twice daily (1/2017 to 4/2018) due to cardiac surgery but had taken prednisone. She felt a little worse. She did not feel [place] helped. Today, she complains of pain in her knees. She had an injection in her right knee which helped. She has swelling in her hands with stiffness lasting 45 minutes. She endorses ankle swelling after sitting, dry cough, easy bruising.     She denies fever, weight loss, blurred vision, vision loss, oral ulcers, dyspnea, nausea, vomiting, dysphagia, abdominal pain, black or bloody stool, fall since last visit, rash and increased thirst.    Ms. Ronnie Ridley has continued her medications for arthritis and reports good tolerance without significant side effects. Last toxicity monitoring by blood work was done on 5/31/2018  and did not reveal any significant adverse effects, except platelets [phone].     Most recent inflammatory markers from 11/06/2017 revealed a ESR 13 mm/hr (previously 21 mm/hr) and CRP 27.3 mg/L (previously 8.1 mg/L). The patient has had any interval hospital admissions or surgeries and no infection. REVIEW OF SYSTEMS    A comprehensive review of systems was performed and pertinent results are documented in the HPI, review of systems is otherwise non-contributory. PAST MEDICAL HISTORY    She has a past medical history of Aortic valve insufficiency (04/25/2016); Bilateral ovarian cysts (6/24/2015); Carpal tunnel syndrome; Cataract; Coagulation defects; Fe deficiency anemia; Gastrointestinal disorder; GERD (gastroesophageal reflux disease); Hypertension; Iritis; Migraine without status migrainosus, not intractable (6/6/2016); Osteopenia; Pulmonary embolism (HCC); RA (rheumatoid arthritis) ([facility] [address] Thromboembolus ([facility] [address] (4-5-12); and Thyroid disease. FAMILY HISTORY    Her family history includes Arthritis-osteo in her sister; Arthritis-rheumatoid in her father and sister; Cancer in her father and maternal grandmother; Diabetes in her maternal aunt; Headache in her sister; Hypertension in her father; Lung Disease in her father; Migraines in her father. SOCIAL HISTORY    She reports that she has never smoked. She has never used smokeless tobacco. She reports that she does not drink alcohol or use illicit drugs. MEDICATIONS    Current Outpatient Prescriptions   Medication Sig Dispense Refill    [START ON 7/8/2018] methotrexate (RHEUMATREX) 2.5 mg tablet Take 8 Tabs by mouth every Sunday. 96 Tab 0    predniSONE (DELTASONE) 5 mg tablet Take 1 Tab by mouth daily. 90 Tab 0    folic acid (FOLVITE) 1 mg tablet Take 1 Tab by mouth daily. 90 Tab 0    furosemide (LASIX) 40 mg tablet Take 1 Tab by mouth daily.  90 Tab 1    metoprolol tartrate (LOPRESSOR) 50 mg tablet Take 1 Tab by mouth two (2) times a day. 180 Tab 1    docusate sodium (COLACE) 100 mg capsule Take 1 Cap by mouth two (2) times daily as needed for Constipation. 180 Cap 1    pantoprazole (PROTONIX) 40 mg tablet Takes 1 tab before breakfast 90 Tab 1    warfarin (COUMADIN) 2.5 mg tablet Take 1 Tab by mouth daily. 180 Tab 1    potassium chloride (K-DUR, KLOR-CON) 20 mEq tablet Take 1 Tab by mouth daily. 90 Tab 1    acetaminophen (TYLENOL) 325 mg tablet Take 325 mg by mouth every six (6) hours as needed for Pain.  aspirin delayed-release 81 mg tablet Take 81 mg by mouth daily.  cholecalciferol (VITAMIN D3) 1,000 unit tablet Take  by mouth daily.  fluconazole (DIFLUCAN) 150 mg tablet take 1 tablet by mouth as a single dose (Patient taking differently: as needed. take 1 tablet by mouth as a single dose) 1 Tab 3    diclofenac EC (VOLTAREN) 75 mg EC tablet Take 1 Tab by mouth two (2) times a day. 30 Tab 5    albuterol (PROVENTIL HFA, VENTOLIN HFA, PROAIR HFA) 90 mcg/actuation inhaler Take 2 Puffs by inhalation every six (6) hours as needed for Wheezing. 1 Inhaler 0    oxybutynin (DITROPAN) 5 mg tablet Take 1 Tab by mouth two (2) times a day. 60 Tab 5    FLUoxetine (PROZAC) 40 mg capsule Take 1 Cap by mouth daily. 30 Cap 5    ALPRAZolam (XANAX) 0.5 mg tablet 1 tab po bid as needed for severe anxiety and/or sleep 60 Tab 0        ALLERGIES    Allergies   Allergen Reactions    Percocet [Oxycodone-Acetaminophen] Hives    Reglan [Metoclopramide] Anaphylaxis and Anxiety    Ace Inhibitors Cough    Tramadol Itching       PHYSICAL EXAMINATION    Visit Vitals    BP (!) 147/98    Pulse 92    Temp 98.2 °F (36.8 °C)    Resp 18    Ht 5' 2\" (1.575 m)    Wt 187 lb (84.8 kg)    BMI 34.2 kg/m2     Body mass index is 34.2 kg/(m^2).     General: Patient is alert, oriented x 3, not in acute distress    HEENT:   Sclerae are not injected and appear moist.  Oral mucous membranes are moist, there are no ulcers present. There is no alopecia. Neck is supple     Cardiovascular:  Heart is regular rate and rhythm, no murmurs. Chest:  Lungs are clear to auscultation bilaterally. No rhonchi, wheezes, or crackles. Extremities:  Free of clubbing, cyanosis, edema    Neurological exam:  No focal sensory deficits, muscle strength is full in upper and lower extremities     Skin exam:    Psoriasis:     no  Nail Pitting:     no  Onycholysis:     no  Palmoplantar pustulosis:   no  Acne fulminans:    no  Acne conglobata:    no  Hidradenitis Suppurativa:   no  Dissecting cellulitis of the scalp:  no  Pilonidal sinus:    no  Erythema nodosum:    no  Non-Scarring Alopecia:  no  Discoid Lupus:   no  Subacute Cutaneous Lupus:   no  Heliotrope Rash:   no  Upper Arm Erythema:   no  Shawl Sign:    no  V-sign:     no  Holster sign:    no  Gottron's papules:   no  Gottron's sign:    no  Calcinosis:    no  Raynaud's Phenomenon:  no  's Hands:   no  Periungual erythema:   no  Abnormal Nailfold Capillaries: no  Livedo Reticularis:   no  Scalp Erythema:   no  Rheumatoid Nodules:   no    Musculoskeletal:  A comprehensive musculoskeletal exam was performed for all joints of each upper and lower extremity and assessed for swelling, tenderness and range of motion. Decreased ROM of right wrist  Bilateral knee crepitus without effusion  Bilateral ankle edema.     Z-Deformities:   Yes (bilateral)  S[place]sey Neck Deformities:  Yes (LEFT: 2nd, 3rd, 4th; RIGHT: 2nd, 3rd, 4th, 5th)  Boutonierre's Deformities:  Yes (LEFT: 5th)  Ulnar Deviation:   Yes   MCP Subluxation:  Yes (bilateral)      Joint Count 7/3/2018 8/14/2017 4/25/2017 1/10/2017 11/29/2016 7/15/2016 5/25/2016   Patient pain (0-100) 40 - - - - - -   MHAQ 1 - - - - - -   Left wrist- Tender 1 - - - - - -   Left wrist- Swollen 1 - - - - - -   Left 1st MCP - Swollen 1 - - - - - -   Left 2nd MCP - Swollen 1 - - - - - -   Left 3rd MCP - Swollen 1 - - - - - -   Left 5th MCP - Swollen 1 - - - - - -   Left 2nd PIP - Swollen 1 - - - - - -   Right wrist- Tender 1 - - - - - -   Right wrist- Swollen 1 - - - - - -   Right 1st MCP - Swollen 1 - - - - - -   Right 3rd MCP - Swollen 1 - - - - - -   Right 4th MCP - Swollen 1 - - - - - -   Right 5th MCP - Swollen 1 - - - - - -   Right 2nd PIP - Swollen 1 - - - - - -   Tender Joint Count (Total) 2 - - - - - -   Swollen Joint Count (Total) 12 - - - - - -   Physician Assessment (0-10) 4 3 4 5 6 5 6   Patient Assessment (0-10) 3 7 6 5.5 8 8 6   CDAI Total (calculated) 21 - - - - - -   CDAI - 12 16 13.5 26 16 16     DATA REVIEW    Laboratory     Recent laboratory results were reviewed, summarized, and discussed with the patient. Imaging    Musculoskeletal Ultrasound     None     Radiographs     Right Wrist 1/10/2017: Increased narrowing of the radiocarpal and intercarpal joints. Increased widening of the scapholunate joint space. Increased volume loss of the lunate and proximal pole of the scaphoid. Increased proximal migration of the capitate, which now articulates with the radius. New erosions in the distal radius and ulna. Mild soft tissue swelling is new. No acute fracture.     Chest 3/15/2018: heart size is prominent. The a prominent interstitial pattern is once again seen but unchanged. No focal consolidation.     Lumbar Spine 4/25/2016: Severe degenerative disc disease at L5-S1. Mild L4-L5 degenerative disc disease and mild L5-S1 facet osteoarthritis.        Left Hand 8/15/2014: no fracture. There is a stable subchondral cyst or intraosseous ganglion in the third metacarpal head. Mild lateral subluxation of the first metacarpal is noted. Bone mineralization is within normal limits.     Right Knee 2/21/2013: no acute fracture or dislocation. Increased tricompartmental osteoarthritis, severe in the medial compartment. Increased size of the marginal osteophytes.  Small suprapatellar joint effusion is unchanged.     Right Knee 4/11/2012: There is no acute fracture-dislocation. There is mild narrowing of the medial tibiofemoral joint space. Medial tibiofemoral osteophyte is noted. Small osteophyte is noted at the inferior margin of the patella. Bones are well-mineralized. Soft tissues are normal.     Bilateral Hand 9/02/2011: RIGHT: Mild DJD of the carpal bones with joint space narrowing spurring. Bone appears normal in mineral content no bony erosions. No fracture. LEFT: The bone appears normal in mineral content. There is mild DJD of the carpal bones with joint space narrowing and spurring. No bony erosion no fracture.      Chest 9/02/2011: The heart size is normal, no acute infiltrate.       Bilateral Hand 2/10/2010: LEFT: no fracture. There are degenerative changes involving the lunate. There is a tiny calcification along the radial surface distal aspect right third metacarpal shaft may be ligamentous. RIGHT: no fracture. There are degenerative changes involving the lunate. There is a tiny calcification along the radial surface distal aspect right third metacarpal shaft may be  Ligamentous.      Bilateral Foot 2/10/2010: RIGHT: There is mild soft tissue swelling shown medial and plantar to the first metatarsophalangeal joint. There is evidence for medial osteotomy of the first metatarsal head. Mild osteoarthritis of the first MTP joint is noted with the other articulations appearing normal. There is a moderate plantar calcaneal spur. Bony mineralization is normal. There is a density overlying the lateral soft tissues adjacent to the fifth metatarsal neck on the frontal view only which is felt to represent an artifact. LEFT: There is mild nonspecific soft tissue swelling overlying the 1st ray including the soft tissues medial and plantar to the first metatarsophalangeal joint. There is mild hallux valgus with mild osteoarthritis of the first metatarsophalangeal joint. No acute fracture or dislocation is shown.  An accessory navicular ossicle is noted. There is a moderate plantar calcaneal spur. Overall bony mineralization is within normal limits.     CT Imaging     CT Chest without contrast 4/22/2016: LUNGS: The tiny 1 to 2 mm subpleural nodules are unchanged. The lungs are otherwise clear of mass, nodule, airspace disease or edema. PLEURA: There is no pleural effusion or pneumothorax. The absence of intravenous contrast reduces the sensitivity for evaluation of the mediastinum and upper abdominal organs. AORTA: The aorta has a normal contour with no evidence of aneurysm. MEDIASTINUM: No mediastinal or hilar or axillary adenopathy is appreciated. BONES AND SOFT TISSUES: The bones and soft tissues of the chest wall are within normal limits. UPPER ABDOMEN: The visualized portions of the upper abdominal organs are normal.     CTA Chest with and without contrast 4/16/2012: Distal pulmonary artery opacification is still somewhat suboptimal despite repeat of the contrast bolus. However, there is thrombus within subsegmental branches in the right lower lobe pulmonary artery, consistent with pulmonary thromboembolism. There is no central pulmonary embolism. The lungs are clear of mass, nodule, airspace disease or edema.  There is no mediastinal or hilar adenopathy or mass.  The aorta enhances normally without evidence of aneurysm or dissection. The visualized portions of the upper abdominal organs are normal.  There are no significant bony abnormalities.      MR Imaging     None     DXA      DXA 4/25/2016: (excluded None) lumbar spine L1-L4 T score -0.9 (BMD 1.055 g/cm2), left femoral neck T score: -1.3 (0.765 g/cm2), left total hip T score: -1.0 (0.878 g/cm2), right femoral neck T score: -0.6 (0.871 g/cm2), right total hip T score: -0.4 (0.961 g/cm2), and distal one third left radius T score 1.0 (BMD 0.745 g/cm2). FRAX score 2.5 % probability in 10 years for major osteoporotic fracture and 0.2 % 10 year probability of hip fracture.  I personally reviewed the images of this study. ASSESSMENT AND PLAN    This is a follow-up visit for Ms. Reza Haji. 1) Erosive Rheumatoid Arthritis. She has a long standing history of chronic Rheumatoid Arthritis. She was on methotrexate 20 mg weekly, sulfasalazine 500 mg twice daily and Xeljanz 5 mg twice daily but continued to have active disease per report. She has been off sulfasalazine and [place] since around April prior to heart surgery and felt some worsening but she does not feel [place] was helping. Enbrel helped her the most but she lost effectiveness. 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 I discussed Actemra and Rituximab. She preferred rituximab, which I will submit if her serologies are positive for Rheumatoid Arthritis. I ordered labs and radiographs today. 2) Long Term Use of Immunosuppressants. The patient remains on immunomodulatory medications (methotrexate) and requires frequent toxicity monitoring by blood work. Respective labs were ordered (CBC and CMP). 3) Long Term Use of Systemic Steroids (Prednisone). She is on prednisone 5 mg daily. I plan to wean her off. 4) Bilateral Knee Osteoarthritis. The patient has osteoarthritis, which is also known as \"wear and tear arthritis,\" non-inflammatory arthritis or mechanical arthritis. There are hereditary, vocational and posttraumatic joint injuries predisposing factors. I recommend maintaining a healthy weight to slow the progression of osteoarthritis in addition to following the [employer] of Rheumatology Osteoarthritis Treatment Guidelines: (1) non-pharmacologic modalities such as aerobic, aquatic, and/or resistance exercises as well as weight loss for overweight patients; in addition to (2) pharmacologic modalities such as acetaminophen as first line, oral and topical NSAIDs as second line, tramadol as third line and intra-articular corticosteroid injections as fourth line (Chantale WANG, et al. Arthritis Care Res [facility] ORTHOPEDIC). 2012;64(4):465). Naproxen is a low OQUENDO-2 selectivity inhibitor that poses lower cardiovascular risk than other NSAIDs (Angiomitch DJ, Anselmo OTOOLE. Clinical Pharmacology and Cardiovascular Safety of Naproxen. Am J Cardiovasc Drugs. 2016 Nov 8). NSAIDs should not be used in patients on blood thinners, chronic kidney disease, high risk coronary artery disease, and inflammatory bowel disease (ulcerative colitis or Crohn's disease) Joint replacement surgery if all previous fail, knowing that there is a 10 year lifespan per prosthesis. I recommend weight loss because every 1 lb of extra weight is approximately 5 lbs of extra weight on the knees. I asked the patient to count their daily caloric intake and try not to exceed 1469-1458 calories. I asked the patient to perform at least 30 minutes of physical exercise per day, such as walking, climbing stairs, or swimming. If they have access to a pool, I recommend walking in the pool for at least 30 minutes every day. Performing at least 8 weeks of yoga (two 60-minute classes and 1 home practice per week) was shown to help individuals with arthritis safely increase physical activity, and improve physical and psychological health [facility] et al. [facility] Rheumatol. 2015 Jul;42(7):1194-202). The patient voiced understanding of the aforementioned assessment and plan. Summary of plan was provided in the After Visit Summary patient instructions.      TODAY'S ORDERS    Orders Placed This Encounter    QUANTIFERON TB GOLD    XR HAND RT MIN 3 V    XR HAND LT MIN 3 V    XR FOOT RT MIN 3 V    XR FOOT LT MIN 3 V    CYCLIC CITRUL PEPTIDE AB, IGG    CHRONIC HEPATITIS PANEL    C REACTIVE PROTEIN, QT    SED RATE (ESR)    RHEUMATOID FACTOR, QL    PROTEIN ELECTROPHORESIS W/ REFLX YI    URIC ACID    VITAMIN D, 25 HYDROXY    methotrexate (RHEUMATREX) 2.5 mg tablet    predniSONE (DELTASONE) 5 mg tablet    folic acid (FOLVITE) 1 mg tablet     Future Appointments  Date Time Provider Shirley Crys   10/4/2018 10:00 AM MD Jessica Boston MD, [facility]    Adult Rheumatology   Musculoskeletal Ultrasound Certified  [address] [facility], [address] [place] [address] Road   Phone [phone]  Fax [phone]

## 2018-07-05 ENCOUNTER — HOSPITAL ENCOUNTER (EMERGENCY)
Age: 53
End: 2018-07-05
Attending: EMERGENCY MEDICINE
Payer: SELF-PAY

## 2018-07-05 VITALS — RESPIRATION RATE: 3 BRPM

## 2018-07-05 LAB
25(OH)D3+25(OH)D2 SERPL-MCNC: 27.3 NG/ML (ref 30–100)
ALBUMIN SERPL ELPH-MCNC: 3.3 G/DL (ref 2.9–4.4)
ALBUMIN/GLOB SERPL: 1 {RATIO} (ref 0.7–1.7)
ALPHA1 GLOB SERPL ELPH-MCNC: 0.4 G/DL (ref 0–0.4)
ALPHA2 GLOB SERPL ELPH-MCNC: 0.5 G/DL (ref 0.4–1)
B-GLOBULIN SERPL ELPH-MCNC: 1.3 G/DL (ref 0.7–1.3)
CCP IGA+IGG SERPL IA-ACNC: >250 UNITS (ref 0–19)
COMMENT, 144067: NORMAL
CRP SERPL-MCNC: 36.7 MG/L (ref 0–4.9)
ERYTHROCYTE [SEDIMENTATION RATE] IN BLOOD BY WESTERGREN METHOD: 13 MM/HR (ref 0–40)
GAMMA GLOB SERPL ELPH-MCNC: 1.2 G/DL (ref 0.4–1.8)
GLOBULIN SER CALC-MCNC: 3.4 G/DL (ref 2.2–3.9)
HBV CORE AB SERPL QL IA: NEGATIVE
HBV CORE IGM SERPL QL IA: NEGATIVE
HBV E AB SERPL QL IA: NEGATIVE
HBV E AG SERPL QL IA: NEGATIVE
HBV SURFACE AB SER QL: REACTIVE
HBV SURFACE AG SERPL QL IA: NEGATIVE
HCV AB S/CO SERPL IA: <0.1 S/CO RATIO (ref 0–0.9)
M PROTEIN SERPL ELPH-MCNC: NORMAL G/DL
PLEASE NOTE, 011150: NORMAL
PROT PATTERN SERPL ELPH-IMP: NORMAL
PROT SERPL-MCNC: 6.7 G/DL (ref 6–8.5)
RHEUMATOID FACT SERPL-ACNC: 428.7 IU/ML (ref 0–13.9)
URATE SERPL-MCNC: 5.2 MG/DL (ref 2.5–7.1)

## 2018-07-05 PROCEDURE — 99285 EMERGENCY DEPT VISIT HI MDM: CPT

## 2018-07-05 PROCEDURE — 74011250636 HC RX REV CODE- 250/636: Performed by: EMERGENCY MEDICINE

## 2018-07-05 PROCEDURE — 92950 HEART/LUNG RESUSCITATION CPR: CPT

## 2018-07-05 PROCEDURE — 74011000250 HC RX REV CODE- 250: Performed by: EMERGENCY MEDICINE

## 2018-07-05 RX ORDER — EPINEPHRINE 0.1 MG/ML
INJECTION INTRACARDIAC; INTRAVENOUS
Status: COMPLETED | OUTPATIENT
Start: 2018-07-05 | End: 2018-07-05

## 2018-07-05 RX ORDER — SODIUM BICARBONATE 1 MEQ/ML
SYRINGE (ML) INTRAVENOUS
Status: COMPLETED | OUTPATIENT
Start: 2018-07-05 | End: 2018-07-05

## 2018-07-05 RX ORDER — CALCIUM CHLORIDE INJECTION 100 MG/ML
INJECTION, SOLUTION INTRAVENOUS
Status: COMPLETED | OUTPATIENT
Start: 2018-07-05 | End: 2018-07-05

## 2018-07-05 RX ADMIN — EPINEPHRINE 1 MG: 0.1 INJECTION, SOLUTION ENDOTRACHEAL; INTRACARDIAC; INTRAVENOUS at 07:47

## 2018-07-05 RX ADMIN — EPINEPHRINE 1 MG: 0.1 INJECTION, SOLUTION ENDOTRACHEAL; INTRACARDIAC; INTRAVENOUS at 07:42

## 2018-07-05 RX ADMIN — SODIUM BICARBONATE 50 MEQ: 84 INJECTION, SOLUTION INTRAVENOUS at 07:42

## 2018-07-05 RX ADMIN — CALCIUM CHLORIDE 1 G: 100 INJECTION, SOLUTION INTRAVENOUS at 07:44

## 2018-07-05 NOTE — ED NOTES
[facility] reports called for decreased mental status, pt found warm in asystole, CPR initiated approx 25 minutes prior to arrival in ED. Epi given x5, amiodarone 300mg and 150mg given, defibrillation x2 reported.   CPR in process upon arrival.

## 2018-07-05 NOTE — PROGRESS NOTES
Spiritual Care Assessment/Progress Note  [facility]      NAME: Joelle Benites      MRN: [id number]  AGE: 48 y.o. SEX: female  Hinduism Affiliation: Yarsani   Language: English     7/5/2018     Total Time (in minutes): 5     Spiritual Assessment begun in [facility] EMERGENCY DEPT through conversation with:         []Patient        [] Family    [] Friend(s)        Reason for Consult: Code Blue/99     Spiritual beliefs: (Please include comment if needed)     [] Identifies with a moshe tradition:         [] Supported by a moshe community:            [] Claims no spiritual orientation:           [] Seeking spiritual identity:                [] Adheres to an individual form of spirituality:           [x] Not able to assess:                           Identified resources for coping:      [] Prayer                               [] Music                  [] Guided Imagery     [] Family/friends                 [] Pet visits     [] Devotional reading                         [] Unknown     [] Other:                                              Interventions offered during this visit: (See comments for more details)    Patient Interventions: Prayer (actual)           Plan of Care:     [] Support spiritual and/or cultural needs    [] Support AMD and/or advance care planning process      [x] Support grieving process   [] Coordinate Rites and/or Rituals    [] Coordination with community clergy   [] No spiritual needs identified at this time   [] Detailed Plan of Care below (See Comments)  [] Make referral to Music Therapy  [] Make referral to Pet Therapy     [] Make referral to Addiction services  [] Make referral to [facility]  [] Make referral to Spiritual Care Partner  [] No future visits requested        [] Follow up visits as needed   Responded to page alerting to Code Blue in [address]. Upon arrival informed by staff that pt had past. Physician asked that  accompany once sisters arrive.  Will wait upon arrival. Prayer of commendation on pt's behalf. ROSI Martínez. Masha Long

## 2018-07-05 NOTE — PROGRESS NOTES
Provided bereavement support to patient's family when they arrived. Patient's daughter, son, sisters, and a few other unidentified relatives were present. Escorted them to patient's room, offered condolences and assurance of prayer. 930-left ER for brief meeting    950-returned to patient's room as family was preparing to leave. Escorted them out of ER.       Olu Chin, MPS, [address] [facility], [address] [place] [address] [employer] Paging Service  [phone]

## 2018-07-05 NOTE — ED NOTES
Brother of patient arrived, states he was at work, not sure why she is here, brother taken to family consult room pending sister arrival pastoral care aware,

## 2018-07-05 NOTE — ED PROVIDER NOTES
EMERGENCY DEPARTMENT HISTORY AND PHYSICAL EXAM      Date: 7/5/2018  Patient Name: Omer Blue    History of Presenting Illness     No chief complaint on file. History Provided By: EMS    HPI: Omer Blue, 48 y.o. female with PMHx significant for HTN, GERD, PE, aortic valve insufficiency presents unresponsive via EMS to the ED in cardiac arrest. Per EMS, patient had an unwitnessed arrest, and was found pulseless and apneic by her family ~30 minutes PTA today; EMS reports ~4 minute response time after receiving call. EMS reports pt was \"very warm to touch\" at time of their arrival. Upon arrival to ED, pt in asystole in cardiac monitor. EMS reports pt was intubated en route, given epinephrine 1 mg x5, amiodarone 300 mg and 150 mg, and was defibrillated twice with initial change to v-tach before returning to asystole. EMS reports pt was last known well ~2300 last night (7/4/18) per family. EMS notes pt had BGL ~163 en route to ED. Patient's history limited due to the acuity of her medical condition. PCP: Everton Ramos NP    Current Outpatient Prescriptions   Medication Sig Dispense Refill    [START ON 7/8/2018] methotrexate (RHEUMATREX) 2.5 mg tablet Take 8 Tabs by mouth every Sunday. 96 Tab 0    predniSONE (DELTASONE) 5 mg tablet Take 1 Tab by mouth daily. 90 Tab 0    folic acid (FOLVITE) 1 mg tablet Take 1 Tab by mouth daily. 90 Tab 0    furosemide (LASIX) 40 mg tablet Take 1 Tab by mouth daily. 90 Tab 1    metoprolol tartrate (LOPRESSOR) 50 mg tablet Take 1 Tab by mouth two (2) times a day. 180 Tab 1    docusate sodium (COLACE) 100 mg capsule Take 1 Cap by mouth two (2) times daily as needed for Constipation. 180 Cap 1    pantoprazole (PROTONIX) 40 mg tablet Takes 1 tab before breakfast 90 Tab 1    warfarin (COUMADIN) 2.5 mg tablet Take 1 Tab by mouth daily. 180 Tab 1    potassium chloride (K-DUR, KLOR-CON) 20 mEq tablet Take 1 Tab by mouth daily.  90 Tab 1    acetaminophen (TYLENOL) 325 mg tablet Take 325 mg by mouth every six (6) hours as needed for Pain.  aspirin delayed-release 81 mg tablet Take 81 mg by mouth daily.  cholecalciferol (VITAMIN D3) 1,000 unit tablet Take  by mouth daily.  fluconazole (DIFLUCAN) 150 mg tablet take 1 tablet by mouth as a single dose (Patient taking differently: as needed. take 1 tablet by mouth as a single dose) 1 Tab 3    diclofenac EC (VOLTAREN) 75 mg EC tablet Take 1 Tab by mouth two (2) times a day. 30 Tab 5    albuterol (PROVENTIL HFA, VENTOLIN HFA, PROAIR HFA) 90 mcg/actuation inhaler Take 2 Puffs by inhalation every six (6) hours as needed for Wheezing. 1 Inhaler 0    oxybutynin (DITROPAN) 5 mg tablet Take 1 Tab by mouth two (2) times a day. 60 Tab 5    FLUoxetine (PROZAC) 40 mg capsule Take 1 Cap by mouth daily.  30 Cap 5    ALPRAZolam (XANAX) 0.5 mg tablet 1 tab po bid as needed for severe anxiety and/or sleep 60 Tab 0       Past History     Past Medical History:  Past Medical History:   Diagnosis Date    Aortic valve insufficiency 2016    heart murmer    Bilateral ovarian cysts 2015    Carpal tunnel syndrome     Cataract     Coagulation defects     recently anemic    Fe deficiency anemia     Gastrointestinal disorder     GERD    GERD (gastroesophageal reflux disease)     Hypertension     Iritis     Migraine without status migrainosus, not intractable 2016    Osteopenia     Pulmonary embolism (HCC)     RA (rheumatoid arthritis) ([address]     Thromboembolus ([address] 4-5-12    right calf    Thyroid disease     nodules on thyroid       Past Surgical History:  Past Surgical History:   Procedure Laterality Date    CARDIAC SURG PROCEDURE UNLIST  2018    aortic valve replacement surgery by Dr. Luca Quinteros at [facility] [address] [place] [address], DIAGNOSTIC      normal    HX GYN      tubal ligation    HX GYN  ,      x2    HX ORTHOPAEDIC      R foot surgery       Family History:  Family History Problem Relation Age of Onset   Fadi Arthritis-rheumatoid Father     Cancer Father      lung    Hypertension Father     Lung Disease Father      lung cancer    Migraines Father      cluster migraines    Arthritis-rheumatoid Sister     Headache Sister     Arthritis-osteo Sister     Diabetes Maternal Aunt     Cancer Maternal Grandmother      cervical       Social History:  Social History   Substance Use Topics    Smoking status: Never Smoker    Smokeless tobacco: Never Used    Alcohol use No       Allergies: Allergies   Allergen Reactions    Percocet [Oxycodone-Acetaminophen] Hives    Reglan [Metoclopramide] Anaphylaxis and Anxiety    Ace Inhibitors Cough    Tramadol Itching         Review of Systems   Review of Systems   Unable to perform ROS: Patient unresponsive       Physical Exam   Physical Exam   Eyes:   Pupils fixed and dilated   Neck: Neck supple. Cardiovascular:   No spontaneous cardiac activity   Pulmonary/Chest:   Midline sternotomy scar, no spontaneous respirations, intubated   Musculoskeletal:   IO left shoulder     Medical Decision Making   I am the first provider for this patient. I reviewed the vital signs, available nursing notes, past medical history, past surgical history, family history and social history. Vital Signs-Reviewed the patient's vital signs. Patient Vitals for the past 12 hrs:   Pulse Resp   07/05/18 0751 (!) 0 (!) 3   07/05/18 0750 (!) 0 14   07/05/18 0749 (!) 102 (!) 79   07/05/18 0747 83 (!) 71   07/05/18 0746 95 (!) 73   07/05/18 0745 (!) 108 (!) 76   07/05/18 0744 (!) 108 (!) 76   07/05/18 0743 100 (!) 31   07/05/18 0742 (!) 106 (!) 78     Records Reviewed: Nursing Notes, Old Medical Records, Previous Radiology Studies and Previous Laboratory Studies    Provider Notes (Medical Decision Making):   Patient presented with CPR in progress. Initial rhythm asystole. Continued CPR, epinephrine, amiodarone, calcium administered without return of pulse.  Time of death was called. ED Course:   Initial assessment performed. The patients presenting problems have been discussed, and they are in agreement with the care plan formulated and outlined with them. I have encouraged them to ask questions as they arise throughout their visit. PROGRESS NOTE:   7:38 AM  Patient arrives to ED via EMS in asystole, CPR in progress. 7:39 AM  Bicarb administered. 7:40 AM  Pulse check after 2 minutes of CPR after arrival. Patient in asystole on monitor, CPR resumed. 7:41 AM  Epinephrine 1 mg administered. 7:43 AM  Pulse check after 2 minutes of CPR. Patient remains in asystole; CPR resumed. 7:44 AM  Pt given epinephrine 1 mg.    7:45 AM  Pulse check after 2 minutes of CPR. Patient remains in asystole; CPR resumed. 7:47 AM  Pt given epinephrine 1 mg.    7:48 AM  Pulse check, CPR held. Patient remains in asystole, no evidence of cardiac activity on bedside US performed by Ameena Lr MD.    7:49 AM  Time of death called. Disposition:  ; cardiac arrest.    Diagnosis     Clinical Impression: No diagnosis found. Attestations: This note is prepared by Bart Banks, acting as Scribe for Mikaela Lopes. Justus Apple, [facility] Justus Apple MD: The scribe's documentation has been prepared under my direction and personally reviewed by me in its entirety. I confirm that the note above accurately reflects all work, treatment, procedures, and medical decision making performed by me.

## 2018-07-07 LAB
ANNOTATION COMMENT IMP: NORMAL
GAMMA INTERFERON BACKGROUND BLD IA-ACNC: 0.05 IU/ML
M TB IFN-G BLD-IMP: NEGATIVE
M TB IFN-G CD4+ BCKGRND COR BLD-ACNC: <0 IU/ML
M TB IFN-G CD4+ T-CELLS BLD-ACNC: 0.04 IU/ML
MITOGEN IGNF BLD-ACNC: 6.43 IU/ML
QUANTIFERON INCUBATION: NORMAL
SERVICE CMNT-IMP: NORMAL

## 2021-08-19 NOTE — PROGRESS NOTES
HISTORY OF PRESENT ILLNESS  Loy Bonilla is a 46 y.o. female. HPI Ms. Gerry Don presents for follow up of rheumatoid arthritis. Arthritis remains active and is noted \"all over. \" She relates joint stiffness ( 45 minutes in the AM ) and swelling (hands and left foot). She has not had fevers. Right hand numbness and tingling has been worse over the past month (no improvement with bracing). She has remained at prednisone 4 mg daily. She is taking methotrexate 20 mg weekly. She is taking sulfasalazine 1000 mg twice daily. She takes diclofenac 75 mg every 3-4 weeks. She stopped Enbrel after last visit. She got approved for [place] yesterday: medication is due to arrive Thursday. She was approved for disability last month. She has not taken vitamin D recently. Interim history is reviewed  Current Outpatient Prescriptions   Medication Sig Dispense Refill    methotrexate (RHEUMATREX) 2.5 mg tablet Take 8 Tabs by mouth every Sunday. 36 Tab 3    folic acid (FOLVITE) 1 mg tablet Take 1 Tab by mouth daily. 90 Tab 3    predniSONE (DELTASONE) 1 mg tablet Instead of 5 mg tablet. Take 4 mg once daily. Lower by 1 mg every 2-4 weeks if tolerated. 120 Tab 5    hydrochlorothiazide (HYDRODIURIL) 25 mg tablet Take 1 Tab by mouth daily. 30 Tab 11    diclofenac EC (VOLTAREN) 75 mg EC tablet Take 1 Tab by mouth two (2) times a day. 30 Tab 5    gabapentin (NEURONTIN) 100 mg capsule 2 caps po tid 90 Cap 5    sulfaSALAzine EC (AZULFIDINE EN-TABS) 500 mg EC tablet 1 tab daily X 1 week; if tolerated 1 tab BID X 1 week; if tolerated, increase to 2 tabs  Tab 5    albuterol (PROVENTIL HFA, VENTOLIN HFA, PROAIR HFA) 90 mcg/actuation inhaler Take 2 Puffs by inhalation every six (6) hours as needed for Wheezing. 1 Inhaler 0    amLODIPine (NORVASC) 5 mg tablet Take 1 Tab by mouth daily. 30 Tab 5    oxybutynin (DITROPAN) 5 mg tablet Take 1 Tab by mouth two (2) times a day.  60 Tab 5    FLUoxetine (PROZAC) 40 mg capsule Take 1 Cap by mouth Normal daily. 30 Cap 5    ALPRAZolam (XANAX) 0.5 mg tablet 1 tab po bid as needed for severe anxiety and/or sleep 60 Tab 0    ondansetron (ZOFRAN ODT) 4 mg disintegrating tablet Take 1 Tab by mouth every eight (8) hours as needed for Nausea. 10 Tab 0    SUMAtriptan (IMITREX) 50 mg tablet Take 1 Tab by mouth once as needed for Migraine for 1 dose. 15 Tab 3    esomeprazole (NEXIUM) 40 mg capsule Take 1 Cap by mouth daily. 30 Cap 3    lorcaserin (BELVIQ) 10 mg tab Take 10 mg by mouth two (2) times a day. Max Daily Amount: 20 mg. 60 Tab 1    methocarbamol (ROBAXIN) 500 mg tablet Take 1 Tab by mouth nightly as needed. 30 Tab 1     Allergies   Allergen Reactions    Percocet [Oxycodone-Acetaminophen] Hives    Reglan [Metoclopramide] Anaphylaxis and Anxiety    Ace Inhibitors Cough    Tramadol Itching       Review of Systems   Constitutional: Negative for fever. Cardiovascular: Positive for leg swelling. Gastrointestinal:        Dysphagia   Skin: Negative for rash. Endo/Heme/Allergies: Positive for polydipsia. Bruises/bleeds easily. Physical Exam   Vitals reviewed. GENERAL: well developed; well nourished; well groomed; no apparent distress;  in pain  E/N/T: Lips/Teeth/Gums: poor dentition. No areas exposed jaw bone; Oropharynx: normal mucosa, palate, and posterior pharynx;    NECK: Neck is supple with FROM. RESPIRATORY: Lungs clear with BS=B/L  CARDIOVASCULAR: regular rhythm ; 3/6 SUDARSHAN base base; trace pedal edema    GASTROINTESTINAL: no tenderness; no organomegaly    LYMPHATIC: no enlargement of cervical nodes;    MUSCULOSKELETAL:  full peripheral joint exam with tenderness right wrist and the knees. Decreased passive flexion>extension right wrist. No raegan synovitis. Knees 90 degrees flexion; ulnar deviation and subluxation (reducible) at left>right MCPS.   NEURO: Tone normal; Tinel's present over right median nerve  SKIN: no ulcerations or rashes; no skin thickening, induration, or subcutaneous nodules    Lab Results   Component Value Date/Time    WBC 5.1 11/29/2016 11:31 AM    WBC 9.4 06/29/2012 03:21 PM    HGB 14.3 11/29/2016 11:31 AM    HCT 42.9 11/29/2016 11:31 AM    PLATELET 542 44/42/5589 11:31 AM    MCV 88 11/29/2016 11:31 AM     Lab Results   Component Value Date/Time    Iron 30 06/06/2016 10:29 AM    TIBC 420 06/06/2016 10:29 AM    Iron % saturation 7 06/06/2016 10:29 AM    Ferritin 6 06/30/2016 11:15 AM     Lab Results   Component Value Date/Time    Sodium 139 11/29/2016 11:31 AM    Potassium 4.3 11/29/2016 11:31 AM    Chloride 102 11/29/2016 11:31 AM    CO2 24 11/29/2016 11:31 AM    Anion gap 9 06/24/2015 07:45 AM    Glucose 97 11/29/2016 11:31 AM    BUN 12 11/29/2016 11:31 AM    Creatinine 0.56 11/29/2016 11:31 AM    BUN/Creatinine ratio 21 11/29/2016 11:31 AM    GFR est  11/29/2016 11:31 AM    GFR est non- 11/29/2016 11:31 AM    Calcium 10.2 11/29/2016 11:31 AM    Bilirubin, total 0.2 11/29/2016 11:31 AM    ALT 17 11/29/2016 11:31 AM    AST 21 11/29/2016 11:31 AM    Alk. phosphatase 87 11/29/2016 11:31 AM    Protein, total 7.2 11/29/2016 11:31 AM    Albumin 4.0 11/29/2016 11:31 AM    Globulin 3.8 06/24/2015 07:45 AM    A-G Ratio 1.3 11/29/2016 11:31 AM     Lab Results   Component Value Date/Time    Sed rate (ESR) 33 11/29/2016 11:31 AM     MHAQ 1.000    ASSESSMENT and PLAN    ICD-10-CM ICD-9-CM    1. Rheumatoid arthritis involving multiple sites, unspecified rheumatoid factor presence ([facility] [address]): reducible ulnar deviation on exam. Symptoms were controlled with prednisone 5 mg daily, methotrexate 20 mg weekly, and Enbrel 50 mg weekly, along with SSZ 1 gram BID. Symptoms flare noted at last visit. She has stopped Enbrel. Ronen Silva is pending. Current prednisone is 4 mg daily.  M06.9 714.0 C REACTIVE PROTEIN, QT  -taper prednisone by 1 mg every 4 weeks if tolerated  -SSZ 1 gram BID  -methotrexate 20 mg weekly  -begin Xeljanz 5 mg BID      SED RATE (ESR)      XR WRIST RT AP/LAT   2. Long-term use of immunosuppressant medication W08.220 E07.77 METABOLIC PANEL, COMPREHENSIVE      CBC WITH AUTOMATED DIFF      LIPID PANEL  Labs 4 weeks after starting Kim   (upcoming dental work noted: review medications with dentist. Discuss upcoming dental work with recent of medical team). 3. Long term current use of systemic steroids: DXA normal 2011. Z79.52 V58.65 -taper prednisone as directed  -calcium 1200 mg daily total  -vitamin D3 2000 units BID for a couple of months and then once daily  -consider DXA within the year   4. Right wrist pain: carpal tunnel symptoms intermittently with EMG-NCS in 2015. Sudden onset pain after a \"pop\" since last visit. Decreased ROM. M25.531 719.43 XR WRIST RT AP/LAT  If persistent symptoms without acute xray findings, consider evaluation with hand doctor  -continue to use carpal tunnel brace at bedtime   5. Osteoarthritis of both knees, unspecified osteoarthritis type: difficulty ambulating with frequent instability. Underlying OA. No significant relief with injections.  M17.0 715.96 -continue monitoring with orthopedics  -weight loss encouraged  -comfortable, safe, low impact exercise encoruaged Normal Normal Normal

## 2024-08-26 NOTE — TELEPHONE ENCOUNTER
Pharmacy on file verified  Requested Prescriptions     Pending Prescriptions Disp Refills    predniSONE (DELTASONE) 1 mg tablet 120 Tab 1     Sig: Take 5 mg daily. Lower by 1 mg every 1-2 weeks until maintenance of 1 mg daily. Please get more information. What orders does she have questions about.